# Patient Record
Sex: FEMALE | Race: WHITE | Employment: UNEMPLOYED | URBAN - NONMETROPOLITAN AREA
[De-identification: names, ages, dates, MRNs, and addresses within clinical notes are randomized per-mention and may not be internally consistent; named-entity substitution may affect disease eponyms.]

---

## 2020-11-12 ENCOUNTER — APPOINTMENT (OUTPATIENT)
Dept: GENERAL RADIOLOGY | Age: 20
End: 2020-11-12
Attending: EMERGENCY MEDICINE
Payer: COMMERCIAL

## 2020-11-12 ENCOUNTER — HOSPITAL ENCOUNTER (EMERGENCY)
Age: 20
Discharge: HOME | End: 2020-11-12
Attending: EMERGENCY MEDICINE
Payer: COMMERCIAL

## 2020-11-12 VITALS
OXYGEN SATURATION: 97 % | TEMPERATURE: 97.7 F | SYSTOLIC BLOOD PRESSURE: 111 MMHG | RESPIRATION RATE: 18 BRPM | DIASTOLIC BLOOD PRESSURE: 53 MMHG | HEART RATE: 60 BPM

## 2020-11-12 DIAGNOSIS — R07.9 CHEST PAIN, UNSPECIFIED TYPE: Primary | ICD-10-CM

## 2020-11-12 DIAGNOSIS — N39.0 URINARY TRACT INFECTION WITHOUT HEMATURIA, SITE UNSPECIFIED: ICD-10-CM

## 2020-11-12 LAB
AMPHET UR QL SCN: NEGATIVE
APPEARANCE UR: CLEAR
ATRIAL RATE: 58 BPM
BACTERIA URNS QL MICRO: ABNORMAL /HPF
BARBITURATES UR QL SCN: NEGATIVE
BENZODIAZ UR QL: NEGATIVE
BILIRUB UR QL: NEGATIVE
CALCULATED P AXIS, ECG09: 37 DEGREES
CALCULATED R AXIS, ECG10: 56 DEGREES
CALCULATED T AXIS, ECG11: 37 DEGREES
CANNABINOIDS UR QL SCN: NEGATIVE
COCAINE UR QL SCN: NEGATIVE
COLOR UR: ABNORMAL
DIAGNOSIS, 93000: NORMAL
DRUG SCRN COMMENT,DRGCM: NORMAL
ECSTASY, ECST: NEGATIVE
GLUCOSE UR STRIP.AUTO-MCNC: NEGATIVE MG/DL
HCG UR QL: NEGATIVE
HGB UR QL STRIP: NEGATIVE
KETONES UR QL STRIP.AUTO: NEGATIVE MG/DL
LEUKOCYTE ESTERASE UR QL STRIP.AUTO: ABNORMAL
METHADONE UR QL: NEGATIVE
NITRITE UR QL STRIP.AUTO: NEGATIVE
OPIATES UR QL: NEGATIVE
P-R INTERVAL, ECG05: 146 MS
PCP UR QL: NEGATIVE
PH UR STRIP: 6 [PH] (ref 5–8)
PROT UR STRIP-MCNC: NEGATIVE MG/DL
Q-T INTERVAL, ECG07: 427 MS
QRS DURATION, ECG06: 91 MS
QTC CALCULATION (BEZET), ECG08: 434 MS
RBC #/AREA URNS HPF: ABNORMAL /HPF (ref 0–3)
SP GR UR REFRACTOMETRY: 1.01 (ref 1–1.03)
UROBILINOGEN UR QL STRIP.AUTO: 0.2 EU/DL (ref 0.2–1)
VENTRICULAR RATE, ECG03: 62 BPM
WBC URNS QL MICRO: ABNORMAL /HPF (ref 0–5)

## 2020-11-12 PROCEDURE — 81001 URINALYSIS AUTO W/SCOPE: CPT

## 2020-11-12 PROCEDURE — 99284 EMERGENCY DEPT VISIT MOD MDM: CPT

## 2020-11-12 PROCEDURE — 81025 URINE PREGNANCY TEST: CPT

## 2020-11-12 PROCEDURE — 80307 DRUG TEST PRSMV CHEM ANLYZR: CPT

## 2020-11-12 PROCEDURE — 74011250637 HC RX REV CODE- 250/637: Performed by: EMERGENCY MEDICINE

## 2020-11-12 PROCEDURE — 93005 ELECTROCARDIOGRAM TRACING: CPT

## 2020-11-12 PROCEDURE — 71045 X-RAY EXAM CHEST 1 VIEW: CPT

## 2020-11-12 RX ORDER — CEPHALEXIN 500 MG/1
500 CAPSULE ORAL 2 TIMES DAILY
Qty: 10 CAP | Refills: 0 | Status: SHIPPED | OUTPATIENT
Start: 2020-11-12 | End: 2020-11-17

## 2020-11-12 RX ORDER — CEPHALEXIN 250 MG/1
500 CAPSULE ORAL
Status: COMPLETED | OUTPATIENT
Start: 2020-11-12 | End: 2020-11-12

## 2020-11-12 RX ADMIN — CEPHALEXIN 500 MG: 250 CAPSULE ORAL at 14:05

## 2020-11-12 NOTE — ED TRIAGE NOTES
Pt states she was sitting in class at work when she felt an unexplained sharp pain to the chest. Pt rates pain at 3/10 now. Was given 324 of aspirin en route. Pt states she has a past history of anxiety. Pt states this is a new job and she feels overwhelmed with coworkers.

## 2020-11-12 NOTE — ED PROVIDER NOTES
EMERGENCY DEPARTMENT HISTORY AND PHYSICAL EXAM      Date: 11/12/2020  Patient Name: Rand Stern    History of Presenting Illness     Chief Complaint   Patient presents with    Chest Pain    Anxiety       History Provided By: Patient    HPI: Rand tSern, 23 y.o. female with a past medical history significant Anxiety presents to the ED with cc of sudden onset of a sharp chest pain with a rated 8 out of 10 while patient was sitting down at her worksite associated symptoms of dizziness; patient was given 4 aspirins and the pain is now like a dull ache 3 out of 10; patient denies any diaphoresis nausea or vomiting    There are no other complaints, changes, or physical findings at this time. PCP: No primary care provider on file. No current facility-administered medications on file prior to encounter. No current outpatient medications on file prior to encounter. Past History     Past Medical History:  Past Medical History:   Diagnosis Date    Anxiety        Past Surgical History:  History reviewed. No pertinent surgical history. Family History:  History reviewed. No pertinent family history. Social History:  Social History     Tobacco Use    Smoking status: Never Smoker    Smokeless tobacco: Never Used   Substance Use Topics    Alcohol use: Yes     Comment: Socially    Drug use: Never       Allergies: Allergies   Allergen Reactions    Coconut Rash and Swelling         Review of Systems     Review of Systems   Constitutional: Negative for chills and fever. HENT: Negative for rhinorrhea and sore throat. Eyes: Negative for pain and visual disturbance. Respiratory: Negative for cough, chest tightness and shortness of breath. Cardiovascular: Positive for chest pain. Negative for leg swelling. Gastrointestinal: Negative for abdominal pain and nausea. Endocrine: Negative for polydipsia and polyuria. Genitourinary: Negative for dysuria and urgency.    Musculoskeletal: Negative for back pain and myalgias. Skin: Negative for color change and pallor. Neurological: Negative for weakness and numbness. Psychiatric/Behavioral: Negative. Physical Exam     Physical Exam  Vitals signs and nursing note reviewed. Constitutional:       Appearance: She is obese. HENT:      Head: Normocephalic and atraumatic. Eyes:      Extraocular Movements: Extraocular movements intact. Pupils: Pupils are equal, round, and reactive to light. Neck:      Musculoskeletal: Normal range of motion and neck supple. Cardiovascular:      Rate and Rhythm: Normal rate and regular rhythm. Heart sounds: Normal heart sounds. Pulmonary:      Effort: Pulmonary effort is normal.      Breath sounds: Normal breath sounds. Chest:      Chest wall: No tenderness or edema. Abdominal:      General: Bowel sounds are normal.      Palpations: Abdomen is soft. Musculoskeletal: Normal range of motion. Skin:     General: Skin is warm and dry. Capillary Refill: Capillary refill takes less than 2 seconds. Neurological:      General: No focal deficit present. Mental Status: She is alert and oriented to person, place, and time. Psychiatric:         Mood and Affect: Mood normal.         Behavior: Behavior normal.         Lab and Diagnostic Study Results     Labs -   No results found for this or any previous visit (from the past 12 hour(s)). Radiologic Studies -   @lastxrresult@  CT Results  (Last 48 hours)    None        CXR Results  (Last 48 hours)    None            Medical Decision Making   - I am the first provider for this patient. - I reviewed the vital signs, available nursing notes, past medical history, past surgical history, family history and social history. - Initial assessment performed. The patients presenting problems have been discussed, and they are in agreement with the care plan formulated and outlined with them.   I have encouraged them to ask questions as they arise throughout their visit. Vital Signs-Reviewed the patient's vital signs. Patient Vitals for the past 12 hrs:   Temp Pulse Resp BP SpO2   11/12/20 1028     97 %   11/12/20 1021 97.7 °F (36.5 °C) 81 16 (!) 113/52 97 %       Records Reviewed: Nursing Notes    The patient presents with chest pain with a differential diagnosis of  ACS, arrhythmia and pnuemonia      ED Course:     ED Course as of Nov 12 1302   Thu Nov 12, 2020   1124 Patient refused to have any blood work drawn    [SB]   1125 EKG rate 62 sinus rhythm normal axis WA interval 146 QT interval 434 no ST elevations or depressions flattened T waves in leads III, V2, V3    [SB]   1204 Urine Drug Screen [SB]   1205 Leukocyte Esterase(!): Moderate [SB]   1300 Patient informed of lab results we will treat for UTI; chest pain-free    [SB]      ED Course User Index  [SB] Julianna Burnett MD       Provider Notes (Medical Decision Making): MDM       Procedures   Medical Decision Makingedical Decision Making  Performed by: Sreekanth Bowden MD  PROCEDURES:  Procedures       Disposition   Disposition: Condition stable and improved  DC- Adult Discharges: All of the diagnostic tests were reviewed and questions answered. Diagnosis, care plan and treatment options were discussed. The patient understands the instructions and will follow up as directed. The patients results have been reviewed with them. They have been counseled regarding their diagnosis. The patient verbally convey understanding and agreement of the signs, symptoms, diagnosis, treatment and prognosis and additionally agrees to follow up as recommended with their PCP in 24 - 48 hours. They also agree with the care-plan and convey that all of their questions have been answered.   I have also put together some discharge instructions for them that include: 1) educational information regarding their diagnosis, 2) how to care for their diagnosis at home, as well a 3) list of reasons why they would want to return to the ED prior to their follow-up appointment, should their condition change. DISCHARGE PLAN:  1. There are no discharge medications for this patient. 2.   Follow-up Information    None       3. Return to ED if worse   4. There are no discharge medications for this patient. Diagnosis     Clinical Impression: No diagnosis found. Attestations:    Yokasta Kothari MD    Please note that this dictation was completed with Flasht[employer], the computer voice recognition software. Quite often unanticipated grammatical, syntax, homophones, and other interpretive errors are inadvertently transcribed by the computer software. Please disregard these errors. Please excuse any errors that have escaped final proofreading. Thank you.

## 2020-11-12 NOTE — DISCHARGE INSTRUCTIONS
Thank you! Thank you for allowing me to care for you in the emergency department. I sincerely hope that you are satisfied with your visit today. It is my goal to provide you with excellent care. Below you will find a list of your labs and imaging from your visit today. Should you have any questions regarding these results please do not hesitate to call the emergency department. Labs -     Recent Results (from the past 12 hour(s))   URINALYSIS W/ RFLX MICROSCOPIC    Collection Time: 11/12/20 11:00 AM   Result Value Ref Range    Color Yellow/Straw      Appearance Clear Clear      Specific gravity 1.015 1.003 - 1.030      pH (UA) 6.0 5.0 - 8.0      Protein Negative Negative mg/dL    Glucose Negative Negative mg/dL    Ketone Negative Negative mg/dL    Bilirubin Negative Negative      Blood Negative Negative      Urobilinogen 0.2 0.2 - 1.0 EU/dL    Nitrites Negative Negative      Leukocyte Esterase Moderate (A) Negative     HCG URINE, QL    Collection Time: 11/12/20 11:00 AM   Result Value Ref Range    HCG urine, QL Negative Negative     DRUG SCREEN, URINE    Collection Time: 11/12/20 11:00 AM   Result Value Ref Range    AMPHETAMINES Negative Negative      BARBITURATES Negative Negative      BENZODIAZEPINES Negative Negative      COCAINE Negative Negative      ECSTASY, MDMA Negative Negative      METHADONE Negative Negative      OPIATES Negative Negative      PCP(PHENCYCLIDINE) Negative Negative      THC (TH-CANNABINOL) Negative Negative      Drug screen comment        This test is a screen for drugs of abuse in a medical setting only (i.e., they are unconfirmed results and as such must not be used for non-medical purposes, e.g.,employment testing, legal testing). Due to its inherent nature, false positive (FP) and false negative (FN) results may be obtained. Therefore, if necessary for medical care, recommend confirmation of positive findings by GC/MS.    URINE MICROSCOPIC    Collection Time: 11/12/20 11:00 AM Result Value Ref Range    WBC 10-20 0 - 5 /hpf    RBC 0-5 0 - 3 /hpf    Bacteria 1+ (A) Negative /hpf   EKG, 12 LEAD, INITIAL    Collection Time: 11/12/20 11:19 AM   Result Value Ref Range    Ventricular Rate 62 BPM    Atrial Rate 58 BPM    P-R Interval 146 ms    QRS Duration 91 ms    Q-T Interval 427 ms    QTC Calculation (Bezet) 434 ms    Calculated P Axis 37 degrees    Calculated R Axis 56 degrees    Calculated T Axis 37 degrees    Diagnosis Sinus rhythm        Radiologic Studies -   XR CHEST PORT   Final Result   Impression: Unremarkable chest x-ray. CT Results  (Last 48 hours)      None          CXR Results  (Last 48 hours)                 11/12/20 1104  XR CHEST PORT Final result    Impression:  Impression: Unremarkable chest x-ray. Narrative:  History chest pain. No comparison study is available. An AP portable upright view of the chest demonstrates no pneumonia, pneumothorax   or effusion. The heart is not enlarged. The osseous structures are within normal   limits. If you feel that you have not received excellent quality care or timely care, please ask to speak to the nurse manager. Please choose us in the future for your continued health care needs. ------------------------------------------------------------------------------------------------------------  The exam and treatment you received in the Emergency Department were for an urgent problem and are not intended as complete care. It is important that you follow-up with a doctor, nurse practitioner, or physician assistant to:  (1) confirm your diagnosis,  (2) re-evaluation of changes in your illness and treatment, and  (3) for ongoing care. If your symptoms become worse or you do not improve as expected and you are unable to reach your usual health care provider, you should return to the Emergency Department. We are available 24 hours a day.      Please take your discharge instructions with you when you go to your follow-up appointment. If you have any problem arranging a follow-up appointment, contact the Emergency Department immediately. If a prescription has been provided, please have it filled as soon as possible to prevent a delay in treatment. Read the entire medication instruction sheet provided to you by the pharmacy. If you have any questions or reservations about taking the medication due to side effects or interactions with other medications, please call your primary care physician or contact the ER to speak with the charge nurse. Make an appointment with your family doctor or the physician you were referred to for follow-up of this visit as instructed on your discharge paperwork, as this is a mandatory follow-up. Return to the ER if you are unable to be seen or if you are unable to be seen in a timely manner. If you have any problem arranging the follow-up visit, contact the Emergency Department immediately.

## 2020-11-12 NOTE — LETTER
[address] [facility] EMERGENCY DEPT 
[address] 
[phone] Work/School Note Date: 11/12/2020 To Whom It May concern: 
 
 
Parviz Pompa was seen and treated today in the emergency room by the following provider(s): 
Attending Provider: Judi Choi MD. Parviz Pompa is excused from work/school on 11/12/20. She is clear to return to work/school on 11/13/20. Sincerely, 
 
 
 
 
Xiao Diaz

## 2021-02-15 ENCOUNTER — HOSPITAL ENCOUNTER (EMERGENCY)
Age: 21
Discharge: HOME | End: 2021-02-15
Attending: EMERGENCY MEDICINE
Payer: COMMERCIAL

## 2021-02-15 VITALS
OXYGEN SATURATION: 97 % | HEART RATE: 94 BPM | DIASTOLIC BLOOD PRESSURE: 77 MMHG | HEIGHT: 64 IN | TEMPERATURE: 98.1 F | WEIGHT: 220 LBS | SYSTOLIC BLOOD PRESSURE: 128 MMHG | BODY MASS INDEX: 37.56 KG/M2 | RESPIRATION RATE: 18 BRPM

## 2021-02-15 DIAGNOSIS — S00.03XA RIGHT TEMPORAL FRONTAL SCALP CONTUSIONS, INITIAL ENCOUNTER: Primary | ICD-10-CM

## 2021-02-15 PROCEDURE — 99283 EMERGENCY DEPT VISIT LOW MDM: CPT

## 2021-02-15 PROCEDURE — 74011250637 HC RX REV CODE- 250/637: Performed by: EMERGENCY MEDICINE

## 2021-02-15 RX ORDER — ACETAMINOPHEN 500 MG
1000 TABLET ORAL
Status: COMPLETED | OUTPATIENT
Start: 2021-02-15 | End: 2021-02-15

## 2021-02-15 RX ADMIN — ACETAMINOPHEN 1000 MG: 500 TABLET ORAL at 05:06

## 2021-02-15 NOTE — Clinical Note
[address] 
[facility] EMERGENCY DEPT 
[address] 
[phone] Work/School Note Date: 2/15/2021 To Whom It May concern: 
 
 
Martha Cortés was seen and treated today in the emergency room by the following provider(s): 
Attending Provider: Teddy Torres MD. Martha Cortés is excused from work/school on 02/15/21. She is clear to return to work/school on 02/16/21.    
 
 
Sincerely, 
 
 
 
 
Michelle Rose MD

## 2021-02-15 NOTE — ED NOTES
Pt d/c by this RN, all questions and concerns addressed. Pt verbalized understanding of d/c and f/u instructions, ambulatory with steady gait out of ED. Pt continues to deny feeling lightheaded, dizzy or any visual changes.

## 2021-02-15 NOTE — ED PROVIDER NOTES
Patient presents after she tripped and hit her head on the right  Side. No LOC. She denies any neurological complaints.     Duration:  3 1/2 houss    Intensity: moderate    Modified by: nothing.                Past Medical History:   Diagnosis Date   • Anxiety        History reviewed. No pertinent surgical history.      History reviewed. No pertinent family history.    Social History     Socioeconomic History   • Marital status: SINGLE     Spouse name: Not on file   • Number of children: Not on file   • Years of education: Not on file   • Highest education level: Not on file   Occupational History   • Not on file   Social Needs   • Financial resource strain: Not on file   • Food insecurity     Worry: Not on file     Inability: Not on file   • Transportation needs     Medical: Not on file     Non-medical: Not on file   Tobacco Use   • Smoking status: Never Smoker   • Smokeless tobacco: Never Used   Substance and Sexual Activity   • Alcohol use: Yes     Comment: Socially   • Drug use: Never   • Sexual activity: Yes     Partners: Male     Birth control/protection: Condom   Lifestyle   • Physical activity     Days per week: Not on file     Minutes per session: Not on file   • Stress: Not on file   Relationships   • Social connections     Talks on phone: Not on file     Gets together: Not on file     Attends [employer] service: Not on file     Active member of club or organization: Not on file     Attends meetings of clubs or organizations: Not on file     Relationship status: Not on file   • Intimate partner violence     Fear of current or ex partner: Not on file     Emotionally abused: Not on file     Physically abused: Not on file     Forced sexual activity: Not on file   Other Topics Concern   • Not on file   Social History Narrative   • Not on file         ALLERGIES: Coconut    Review of Systems   Constitutional: Negative.    HENT: Negative.    Eyes: Negative.    Respiratory: Negative.    Cardiovascular: Negative.   Gastrointestinal: Negative. Endocrine: Negative. Genitourinary: Negative. Skin: Negative. Allergic/Immunologic: Negative. Neurological: Positive for headaches. Negative for dizziness, facial asymmetry, weakness, light-headedness and numbness. Hematological: Negative. Psychiatric/Behavioral: Negative. All other systems reviewed and are negative. Vitals:    02/15/21 0440   BP: 128/77   Pulse: 94   Resp: 18   Temp: 98.1 °F (36.7 °C)   SpO2: 97%   Weight: 99.8 kg (220 lb)   Height: 5' 4\" (1.626 m)            Physical Exam  Vitals signs and nursing note reviewed. Constitutional:       Appearance: She is well-developed. HENT:      Head: Normocephalic and atraumatic. Comments: Moderate tenderness on palpation of right parietal scalp  Eyes:      Extraocular Movements: Extraocular movements intact. Pupils: Pupils are equal, round, and reactive to light. Neck:      Musculoskeletal: Normal range of motion and neck supple. Cardiovascular:      Rate and Rhythm: Normal rate and regular rhythm. Heart sounds: Normal heart sounds. Pulmonary:      Breath sounds: Normal breath sounds. Abdominal:      General: Bowel sounds are normal.      Palpations: Abdomen is soft. Musculoskeletal: Normal range of motion. Neurological:      General: No focal deficit present. Mental Status: She is alert and oriented to person, place, and time. Mental status is at baseline. Cranial Nerves: No cranial nerve deficit. Sensory: No sensory deficit. Motor: No weakness.    Psychiatric:         Mood and Affect: Mood normal.         Behavior: Behavior normal.          MDM  Number of Diagnoses or Management Options  Risk of Complications, Morbidity, and/or Mortality  Presenting problems: moderate  Diagnostic procedures: low  Management options: low           Procedures

## 2021-02-15 NOTE — ED TRIAGE NOTES
Patient states she was at work. States she went home during break to walk dog. States dog pulled leash and she slipped hitting posterior scalp on concrete surface.

## 2021-02-26 ENCOUNTER — OFFICE VISIT (OUTPATIENT)
Dept: OBGYN CLINIC | Age: 21
End: 2021-02-26
Payer: COMMERCIAL

## 2021-02-26 VITALS
WEIGHT: 222.13 LBS | BODY MASS INDEX: 37.92 KG/M2 | DIASTOLIC BLOOD PRESSURE: 76 MMHG | TEMPERATURE: 98 F | HEIGHT: 64 IN | SYSTOLIC BLOOD PRESSURE: 124 MMHG

## 2021-02-26 DIAGNOSIS — Z00.00 ANNUAL VISIT FOR GENERAL ADULT MEDICAL EXAMINATION WITHOUT ABNORMAL FINDINGS: ICD-10-CM

## 2021-02-26 DIAGNOSIS — Z11.51 SCREENING FOR HUMAN PAPILLOMAVIRUS: ICD-10-CM

## 2021-02-26 DIAGNOSIS — Z11.3 SCREENING FOR VENEREAL DISEASE: ICD-10-CM

## 2021-02-26 DIAGNOSIS — Z01.419 PAP SMEAR, AS PART OF ROUTINE GYNECOLOGICAL EXAMINATION: Primary | ICD-10-CM

## 2021-02-26 PROCEDURE — 99395 PREV VISIT EST AGE 18-39: CPT | Performed by: OBSTETRICS & GYNECOLOGY

## 2021-02-26 NOTE — PROGRESS NOTES
Karen Brown is a 23 y.o. female who presents today for the following:  Chief Complaint   Patient presents with    Annual Exam     Pt presents for annual exam and to discuss ttc. Pt states has irregular cycles and has not had a cycle since october//Fabrice     Family Planning        Allergies   Allergen Reactions    Coconut Rash and Swelling       No current outpatient medications on file. No current facility-administered medications for this visit.         Past Medical History:   Diagnosis Date    Anxiety     Irregular menses        Past Surgical History:   Procedure Laterality Date    HX WISDOM TEETH EXTRACTION         Family History   Problem Relation Age of Onset    Hypertension Maternal Grandmother     Hypertension Maternal Grandfather     Diabetes Maternal Grandfather     Breast Cancer Paternal Grandmother     Hypertension Paternal Grandmother     Hypertension Paternal Grandfather        Social History     Socioeconomic History    Marital status: SINGLE     Spouse name: Not on file    Number of children: Not on file    Years of education: Not on file    Highest education level: Not on file   Occupational History    Not on file   Social Needs    Financial resource strain: Not on file    Food insecurity     Worry: Not on file     Inability: Not on file    Transportation needs     Medical: Not on file     Non-medical: Not on file   Tobacco Use    Smoking status: Never Smoker    Smokeless tobacco: Never Used   Substance and Sexual Activity    Alcohol use: Yes     Comment: Socially    Drug use: Never    Sexual activity: Yes     Partners: Male     Birth control/protection: Condom   Lifestyle    Physical activity     Days per week: Not on file     Minutes per session: Not on file    Stress: Not on file   Relationships    Social connections     Talks on phone: Not on file     Gets together: Not on file     Attends Jainism service: Not on file     Active member of club or organization: Not on file     Attends meetings of clubs or organizations: Not on file     Relationship status: Not on file    Intimate partner violence     Fear of current or ex partner: Not on file     Emotionally abused: Not on file     Physically abused: Not on file     Forced sexual activity: Not on file   Other Topics Concern    Not on file   Social History Narrative    Not on file         ROS   Review of Systems   Constitutional: Negative. HENT: Negative. Eyes: Negative. Respiratory: Negative. Cardiovascular: Negative. Gastrointestinal: Negative. Genitourinary: Negative. Musculoskeletal: Negative. Skin: Negative. Neurological: Negative. Endo/Heme/Allergies: Negative. Psychiatric/Behavioral: Negative. /76   Temp 98 °F (36.7 °C)   Ht 5' 4\" (1.626 m)   Wt 222 lb 2 oz (100.8 kg)   LMP 10/05/2020   BMI 38.13 kg/m²    OBGyn Exam   Constitutional  · Appearance: well-nourished, well developed, alert, in no acute distress    HENT  · Head and Face: appears normal    Neck  · Inspection/Palpation: normal appearance, no masses or tenderness  · Lymph Nodes: no lymphadenopathy present  · Thyroid: gland size normal, nontender, no nodules or masses present on palpation    Breasts   Symmetric, no palpable masses, no tenderness, no skin changes, no nipple abnormality, no nipple discharge, no lymphadenopathy.     Chest  · Respiratory Effort: breathing labored  · Auscultation: normal breath sounds    Cardiovascular  · Heart:  · Auscultation: regular rate and rhythm without murmur    Gastrointestinal  · Abdominal Examination: abdomen non-tender to palpation, normal bowel sounds, no masses present  · Liver and spleen: no hepatomegaly present, spleen not palpable  · Hernias: no hernias identified    Genitourinary  · External Genitalia: normal appearance for age, no discharge present, no tenderness present, no inflammatory lesions present, no masses present, no atrophy present  · Vagina: normal vaginal vault without central or paravaginal defects, no discharge present, no inflammatory lesions present, no masses present  · Bladder: non-tender to palpation  · Urethra: appears normal  · Cervix: normal   · Uterus: normal size, shape and consistency  · Adnexa: no adnexal tenderness present, no adnexal masses present  · Perineum: perineum within normal limits, no evidence of trauma, no rashes or skin lesions present  · Anus: anus within normal limits, no hemorrhoids present  · Inguinal Lymph Nodes: no lymphadenopathy present    Skin  · General Inspection: no rash, no lesions identified    Neurologic/Psychiatric  · Mental Status:  · Orientation: grossly oriented to person, place and time  · Mood and Affect: mood normal, affect appropriate    No results found for this visit on 02/26/21. Orders Placed This Encounter    PAP IG, CT-NG-TV, RFX APTIMA HPV ASCUS ([id number])     Order Specific Question:   Pap Source? Answer:   Cervical     Order Specific Question:   Total Hysterectomy? Answer:   No     Order Specific Question:   Supracervical Hysterectomy? Answer:   No     Order Specific Question:   Post Menopausal?     Answer:   No     Order Specific Question:   Hormone Therapy? Answer:   No     Order Specific Question:   IUD? Answer:   No     Order Specific Question:   Abnormal Bleeding? Answer:   No     Order Specific Question:   Pregnant     Answer:   No     Order Specific Question:   Post Partum? Answer:   No         1. Pap smear, as part of routine gynecological examination    - PAP IG, CT-NG-TV, RFX APTIMA HPV ASCUS ([id number])    2. Screening for human papillomavirus    - PAP IG, CT-NG-TV, RFX APTIMA HPV ASCUS ([id number])    3. Screening for venereal disease    - PAP IG, CT-NG-TV, RFX APTIMA HPV ASCUS ([id number])    4.  Annual visit for general adult medical examination without abnormal findings    TRYING TO CONCEIVE  [address]

## 2021-03-03 LAB
C TRACH RRNA CVX QL NAA+PROBE: NEGATIVE
CYTOLOGIST CVX/VAG CYTO: NORMAL
CYTOLOGY CVX/VAG DOC CYTO: NORMAL
CYTOLOGY CVX/VAG DOC THIN PREP: NORMAL
DX ICD CODE: NORMAL
LABCORP, 190119: NORMAL
Lab: NORMAL
N GONORRHOEA RRNA CVX QL NAA+PROBE: NEGATIVE
OTHER STN SPEC: NORMAL
STAT OF ADQ CVX/VAG CYTO-IMP: NORMAL
T VAGINALIS RRNA SPEC QL NAA+PROBE: NEGATIVE

## 2021-12-28 ENCOUNTER — HOSPITAL ENCOUNTER (EMERGENCY)
Age: 21
Discharge: HOME | End: 2021-12-29

## 2021-12-28 VITALS
OXYGEN SATURATION: 99 % | SYSTOLIC BLOOD PRESSURE: 124 MMHG | WEIGHT: 230 LBS | BODY MASS INDEX: 39.27 KG/M2 | TEMPERATURE: 97.9 F | HEART RATE: 126 BPM | HEIGHT: 64 IN | RESPIRATION RATE: 20 BRPM | DIASTOLIC BLOOD PRESSURE: 58 MMHG

## 2021-12-28 DIAGNOSIS — L05.01 PILONIDAL ABSCESS: Primary | ICD-10-CM

## 2021-12-28 PROCEDURE — 75810000289 HC I&D ABSCESS SIMP/COMP/MULT

## 2021-12-28 PROCEDURE — 99283 EMERGENCY DEPT VISIT LOW MDM: CPT

## 2021-12-28 PROCEDURE — 74011000250 HC RX REV CODE- 250: Performed by: PHYSICIAN ASSISTANT

## 2021-12-28 PROCEDURE — 74011250637 HC RX REV CODE- 250/637: Performed by: PHYSICIAN ASSISTANT

## 2021-12-28 RX ORDER — LIDOCAINE HYDROCHLORIDE 10 MG/ML
20 INJECTION INFILTRATION; PERINEURAL ONCE
Status: COMPLETED | OUTPATIENT
Start: 2021-12-28 | End: 2021-12-28

## 2021-12-28 RX ORDER — HYDROCODONE BITARTRATE AND ACETAMINOPHEN 5; 325 MG/1; MG/1
1 TABLET ORAL
Status: COMPLETED | OUTPATIENT
Start: 2021-12-28 | End: 2021-12-28

## 2021-12-28 RX ADMIN — LIDOCAINE HYDROCHLORIDE 20 ML: 10 INJECTION, SOLUTION INFILTRATION; PERINEURAL at 23:42

## 2021-12-28 RX ADMIN — HYDROCODONE BITARTRATE AND ACETAMINOPHEN 1 TABLET: 5; 325 TABLET ORAL at 23:41

## 2021-12-29 PROCEDURE — 75810000289 HC I&D ABSCESS SIMP/COMP/MULT

## 2021-12-29 PROCEDURE — 99283 EMERGENCY DEPT VISIT LOW MDM: CPT

## 2021-12-29 RX ORDER — CEPHALEXIN 500 MG/1
500 CAPSULE ORAL 3 TIMES DAILY
Qty: 21 CAPSULE | Refills: 0 | Status: SHIPPED | OUTPATIENT
Start: 2021-12-29 | End: 2022-01-05

## 2021-12-29 RX ORDER — SULFAMETHOXAZOLE AND TRIMETHOPRIM 800; 160 MG/1; MG/1
1 TABLET ORAL 2 TIMES DAILY
Qty: 14 TABLET | Refills: 0 | Status: SHIPPED | OUTPATIENT
Start: 2021-12-29 | End: 2022-01-05

## 2021-12-29 NOTE — DISCHARGE INSTRUCTIONS
Warm compresses multiple times per day  Return to ER or general surgery office in 3-4 days for packing removal

## 2021-12-29 NOTE — ED PROVIDER NOTES
EMERGENCY DEPARTMENT HISTORY AND PHYSICAL EXAM      Date: 12/28/2021  Patient Name: Dona Christianson    History of Presenting Illness     Chief Complaint   Patient presents with    Abscess       History Provided By: Patient    HPI: Dona Christianson, 24 y.o. female with a past medical history significant anxiety presents to the ED with cc of pain to the upper portion of her buttocks onset 2 days ago, significantly worse today, pain worse with sitting and palpation of the area. Patient has not examined the area herself due to location. Patient has not tried any treatments at home at this time. She denies any history of similar symptoms. Patient denies fever, chills, body aches, nausea, vomiting, any chance of pregnancy. Last menstrual cycle was 2 weeks ago. There are no other complaints, changes, or physical findings at this time. PCP: None    No current facility-administered medications on file prior to encounter. No current outpatient medications on file prior to encounter. Past History     Past Medical History:  Past Medical History:   Diagnosis Date    Anxiety     Irregular menses        Past Surgical History:  Past Surgical History:   Procedure Laterality Date    HX WISDOM TEETH EXTRACTION         Family History:  Family History   Problem Relation Age of Onset    Hypertension Maternal Grandmother     Hypertension Maternal Grandfather     Diabetes Maternal Grandfather     Breast Cancer Paternal Grandmother     Hypertension Paternal Grandmother     Hypertension Paternal Grandfather        Social History:  Social History     Tobacco Use    Smoking status: Never Smoker    Smokeless tobacco: Never Used   Vaping Use    Vaping Use: Former   Substance Use Topics    Alcohol use: Yes     Comment: Socially    Drug use: Never       Allergies:   Allergies   Allergen Reactions    Coconut Rash and Swelling         Review of Systems   Review of Systems   Constitutional: Negative for activity change, chills and fever. HENT: Negative for congestion, ear pain, rhinorrhea, sneezing and sore throat. Eyes: Negative for pain and visual disturbance. Respiratory: Negative for cough and shortness of breath. Cardiovascular: Negative for chest pain. Gastrointestinal: Negative for abdominal pain, diarrhea, nausea and vomiting. Genitourinary: Negative for dysuria and hematuria. Musculoskeletal: Negative for gait problem. Skin: Negative for rash. Buttocks pain   Neurological: Negative for speech difficulty, weakness and headaches. Psychiatric/Behavioral: The patient is not nervous/anxious. All other systems reviewed and are negative. Physical Exam   Physical Exam  Vitals and nursing note reviewed. Constitutional:       General: She is not in acute distress. Appearance: Normal appearance. She is not ill-appearing or toxic-appearing. HENT:      Head: Normocephalic and atraumatic. Nose: Nose normal.      Mouth/Throat:      Mouth: Mucous membranes are moist.   Eyes:      Extraocular Movements: Extraocular movements intact. Conjunctiva/sclera: Conjunctivae normal.      Pupils: Pupils are equal, round, and reactive to light. Cardiovascular:      Rate and Rhythm: Tachycardia present. Pulses: Normal pulses. Heart sounds: Normal heart sounds. Pulmonary:      Effort: Pulmonary effort is normal. No respiratory distress. Breath sounds: Normal breath sounds. Musculoskeletal:         General: No deformity or signs of injury. Normal range of motion. Cervical back: Normal range of motion. Skin:     General: Skin is warm and dry. Capillary Refill: Capillary refill takes less than 2 seconds. Findings: Abscess and erythema present. No rash. Comments: +3cm abscess noted to superior gluteal cleft c/w pilonidal abscess, +erythema and tenderness to palpation with central area of fluctuance   Neurological:      General: No focal deficit present. Mental Status: She is alert and oriented to person, place, and time. Cranial Nerves: No cranial nerve deficit. Psychiatric:         Mood and Affect: Mood normal.         Diagnostic Study Results     Labs -   No results found for this or any previous visit (from the past 48 hour(s)). Radiologic Studies -   Results from Hospital Encounter encounter on 11/12/20    XR CHEST PORT    Narrative  History chest pain. No comparison study is available. An AP portable upright view of the chest demonstrates no pneumonia, pneumothorax  or effusion. The heart is not enlarged. The osseous structures are within normal  limits. Impression  Impression: Unremarkable chest x-ray. CT Results  (Last 48 hours)    None          Medical Decision Making   I am the first provider for this patient. I reviewed the vital signs, available nursing notes, past medical history, past surgical history, family history and social history. Vital Signs-Reviewed the patient's vital signs. Wt Readings from Last 3 Encounters:   12/28/21 104.3 kg (230 lb)   02/26/21 100.8 kg (222 lb 2 oz)   02/15/21 99.8 kg (220 lb)     Temp Readings from Last 3 Encounters:   12/28/21 97.9 °F (36.6 °C)   02/26/21 98 °F (36.7 °C)   02/15/21 98.1 °F (36.7 °C)     BP Readings from Last 3 Encounters:   12/28/21 (!) 124/58   02/26/21 124/76   02/15/21 128/77     Pulse Readings from Last 3 Encounters:   12/28/21 (!) 126   02/15/21 94   11/12/20 60       No data found. Records Reviewed: Nursing Notes and Old Medical Records    Provider Notes (Medical Decision Making):     MDM  Number of Diagnoses or Management Options  Pilonidal abscess  Diagnosis management comments: 21-year-old female is presenting with pilonidal abscess. The area was anesthetized, incised, and drained. Copious amounts of purulent and bloody material expressed from the area. The wound was packed with iodoform gauze. Patient tachycardic secondary to pain.   No other sirs criteria. Afebrile. Patient has been advised to return to the emergency department in 3 to 4 days for packing removal and likely replacement. She is also been given general surgery follow-up for pilonidal cyst extraction. Amount and/or Complexity of Data Reviewed  Review and summarize past medical records: yes        ED Course:   Initial assessment performed. The patients presenting problems have been discussed, and they are in agreement with the care plan formulated and outlined with them. I have encouraged them to ask questions as they arise throughout their visit. PROCEDURES    I&D Abcess Complex    Date/Time: 12/29/2021 12:25 AM  Performed by: Judy Butler PA-C  Authorized by: Judy Butler PA-C     Consent:     Consent obtained:  Verbal    Consent given by:  Patient    Risks discussed:  Bleeding, incomplete drainage, pain and infection    Alternatives discussed:  Referral, observation and alternative treatment  Location:     Type:  Pilonidal cyst    Location:  Anogenital    Anogenital location:  Pilonidal  Pre-procedure details:     Skin preparation:  Betadine  Anesthesia (see MAR for exact dosages): Anesthesia method:  Local infiltration    Local anesthetic:  Lidocaine 1% w/o epi  Procedure type:     Complexity:  Complex  Procedure details:     Needle aspiration: no      Incision types:  Stab incision    Incision depth:  Dermal    Scalpel blade:  11    Wound management:  Probed and deloculated, irrigated with saline and extensive cleaning    Drainage:  Bloody and purulent    Drainage amount:  Copious    Wound treatment:  Wound left open    Packing materials:  1/4 in iodoform gauze  Post-procedure details:     Patient tolerance of procedure: Tolerated well, no immediate complications           Disposition     Disposition: DC- Adult Discharges: All of the diagnostic tests were reviewed and questions answered. Diagnosis, care plan and treatment options were discussed.   The patient understands the instructions and will follow up as directed. The patients results have been reviewed with them. They have been counseled regarding their diagnosis. The patient verbally convey understanding and agreement of the signs, symptoms, diagnosis, treatment and prognosis and additionally agrees to follow up as recommended with their PCP in 24 - 48 hours. They also agree with the care-plan and convey that all of their questions have been answered. I have also put together some discharge instructions for them that include: 1) educational information regarding their diagnosis, 2) how to care for their diagnosis at home, as well a 3) list of reasons why they would want to return to the ED prior to their follow-up appointment, should their condition change. Discharged       DISCHARGE PLAN:  1. There are no discharge medications for this patient. 2.   Follow-up Information     Follow up With Specialties Details Why Contact Info    Carlito Raines MD Colon and Rectal Surgery, General Surgery Schedule an appointment as soon as possible for a visit  for follow up from ER visit [phone]  [phone]      [facility] EMERGENCY DEPT Emergency Medicine  As needed, If symptoms worsen [address] [facility] [address] [phone]        3. Return to ED if worse   4. Discharge Medication List as of 12/29/2021 12:51 AM      START taking these medications    Details   trimethoprim-sulfamethoxazole (Bactrim DS) 160-800 mg per tablet Take 1 Tablet by mouth two (2) times a day for 7 days. , Normal, Disp-14 Tablet, R-0      cephALEXin (Keflex) 500 mg capsule Take 1 Capsule by mouth three (3) times daily for 7 days. , Normal, Disp-21 Capsule, R-0             Diagnosis     Clinical Impression:   1.  Pilonidal abscess

## 2021-12-29 NOTE — ED TRIAGE NOTES
[facility] pt stated that last Thursday she started to feel pain to her tailbone; stated that today while walking the dogs she had terrible pain and couldn't hardly move; area to coccyx red, swollen and painful

## 2022-01-02 ENCOUNTER — HOSPITAL ENCOUNTER (EMERGENCY)
Age: 22
Discharge: HOME | End: 2022-01-02
Attending: EMERGENCY MEDICINE
Payer: COMMERCIAL

## 2022-01-02 VITALS
HEIGHT: 64 IN | RESPIRATION RATE: 16 BRPM | TEMPERATURE: 98 F | BODY MASS INDEX: 39.27 KG/M2 | OXYGEN SATURATION: 98 % | HEART RATE: 77 BPM | WEIGHT: 230 LBS | DIASTOLIC BLOOD PRESSURE: 61 MMHG | SYSTOLIC BLOOD PRESSURE: 115 MMHG

## 2022-01-02 DIAGNOSIS — L05.01 PILONIDAL ABSCESS: Primary | ICD-10-CM

## 2022-01-02 PROCEDURE — 99283 EMERGENCY DEPT VISIT LOW MDM: CPT

## 2022-01-02 PROCEDURE — 74011250637 HC RX REV CODE- 250/637: Performed by: EMERGENCY MEDICINE

## 2022-01-02 RX ORDER — HYDROCODONE BITARTRATE AND ACETAMINOPHEN 5; 325 MG/1; MG/1
1 TABLET ORAL
Status: COMPLETED | OUTPATIENT
Start: 2022-01-02 | End: 2022-01-02

## 2022-01-02 RX ADMIN — HYDROCODONE BITARTRATE AND ACETAMINOPHEN 1 TABLET: 5; 325 TABLET ORAL at 12:46

## 2022-01-02 NOTE — ED TRIAGE NOTES
Pt states was supposed to follow up with surgeon (abscess near \"tailbone\"). Can't see surgeon until Jan 20 so was instructed to come back to ED in couldn't see surgeon in a week or so. Pinky Angles

## 2022-01-02 NOTE — ED PROVIDER NOTES
EMERGENCY DEPARTMENT HISTORY AND PHYSICAL EXAM      Date: 1/2/2022  Patient Name: Don Goldstein    History of Presenting Illness     No chief complaint on file. History Provided By: Patient    HPI: Don Goldstein, 24 y.o. female with a past medical history significant No significant past medical history presents to the ED with cc of reevaluation of condyle abscess which was drained on 12/20/2021. Patient underwent I&D of toenail abscess at this time with subsequent packing. She was instructed to return to the ED for possible repeat packing if she is unable to follow-up with her surgeon in this timeframe. Patient reports persistent pain to the area. She is currently taking Bactrim and Keflex as prescribed. Patient unable to see a surgeon until the 20th of this month. There are no other complaints, changes, or physical findings at this time. PCP: None    No current facility-administered medications on file prior to encounter. Current Outpatient Medications on File Prior to Encounter   Medication Sig Dispense Refill    trimethoprim-sulfamethoxazole (Bactrim DS) 160-800 mg per tablet Take 1 Tablet by mouth two (2) times a day for 7 days. 14 Tablet 0    cephALEXin (Keflex) 500 mg capsule Take 1 Capsule by mouth three (3) times daily for 7 days.  21 Capsule 0       Past History     Past Medical History:  Past Medical History:   Diagnosis Date    Anxiety     Irregular menses        Past Surgical History:  Past Surgical History:   Procedure Laterality Date    HX WISDOM TEETH EXTRACTION         Family History:  Family History   Problem Relation Age of Onset    Hypertension Maternal Grandmother     Hypertension Maternal Grandfather     Diabetes Maternal Grandfather     Breast Cancer Paternal Grandmother     Hypertension Paternal Grandmother     Hypertension Paternal Grandfather        Social History:  Social History     Tobacco Use    Smoking status: Never Smoker    Smokeless tobacco: Never Used Vaping Use    Vaping Use: Former   Substance Use Topics    Alcohol use: Yes     Comment: Socially    Drug use: Never       Allergies: Allergies   Allergen Reactions    Coconut Rash and Swelling         Review of Systems     Review of Systems   Constitutional: Negative for chills and fever. HENT: Negative for congestion and sore throat. Eyes: Negative for pain and visual disturbance. Respiratory: Negative for cough and shortness of breath. Cardiovascular: Negative for chest pain and palpitations. Gastrointestinal: Negative for constipation, diarrhea, nausea and vomiting. Genitourinary: Negative for dysuria and frequency. Musculoskeletal: Negative for arthralgias and myalgias. Skin: Positive for wound. Negative for color change and rash. Neurological: Negative for dizziness, weakness, light-headedness and headaches. Psychiatric/Behavioral: Negative for dysphoric mood and sleep disturbance. The patient is nervous/anxious. Physical Exam     Physical Exam  Constitutional:       Appearance: Normal appearance. HENT:      Head: Normocephalic and atraumatic. Right Ear: External ear normal.      Left Ear: External ear normal.      Nose: Nose normal.      Mouth/Throat:      Mouth: Mucous membranes are moist.   Eyes:      Extraocular Movements: Extraocular movements intact. Conjunctiva/sclera: Conjunctivae normal.   Cardiovascular:      Rate and Rhythm: Normal rate and regular rhythm. Pulses: Normal pulses. Heart sounds: Normal heart sounds. Pulmonary:      Effort: Pulmonary effort is normal.      Breath sounds: Normal breath sounds. Abdominal:      General: Abdomen is flat. There is no distension. Palpations: Abdomen is soft. Tenderness: There is no abdominal tenderness. Musculoskeletal:         General: Normal range of motion. Cervical back: Normal range of motion. Skin:     General: Skin is warm and dry.       Capillary Refill: Capillary refill takes less than 2 seconds. Comments: Vertical linear incision just proximal to the  cleft approximately 0.25 inches in length with iodoform packing in place. Iodoform packing removed in its entirety and repacked with quarter inch packing strip. No evidence of surrounding cellulitis or reaccumulation of abscess. Neurological:      General: No focal deficit present. Mental Status: She is alert and oriented to person, place, and time. Mental status is at baseline. Psychiatric:         Mood and Affect: Mood normal.         Behavior: Behavior normal.         Lab and Diagnostic Study Results     Labs -   No results found for this or any previous visit (from the past 12 hour(s)). Radiologic Studies -   @lastxrresult@  CT Results  (Last 48 hours)    None        CXR Results  (Last 48 hours)    None            Medical Decision Making   - I am the first provider for this patient. - I reviewed the vital signs, available nursing notes, past medical history, past surgical history, family history and social history. - Initial assessment performed. The patients presenting problems have been discussed, and they are in agreement with the care plan formulated and outlined with them. I have encouraged them to ask questions as they arise throughout their visit. Vital Signs-Reviewed the patient's vital signs. Patient Vitals for the past 12 hrs:   Temp Pulse Resp BP SpO2   22 1207 98 °F (36.7 °C) 77 16 115/61 98 %       Records Reviewed: Nursing Notes    The patient presents with pilonidal abscess reevaluation with a differential diagnosis of draining pilonidal abscess, developing cellulitis, abscess reaccumulation      ED Course:          Provider Notes (Medical Decision Making):   68-year-old female presenting for reevaluation of her abscess which was drained last week. Patient taking antibiotics as prescribed. On physical exam iodoform gauze removed.   No evidence of surrounding cellulitis or reaccumulation of abscess. New, clean quarter inch iodoform packing gauze reinserted. Patient tolerated procedure without difficulty. Patient instructed to follow-up with their primary care physician and return to the ED if they develops any new or worsening symptoms. MDM       Procedures   Medical Decision Makingedical Decision Making  Performed by: Darryle Moloney, DO  PROCEDURES:  Procedures       Disposition   Disposition: Condition stable  DC- Adult Discharges: All of the diagnostic tests were reviewed and questions answered. Diagnosis, care plan and treatment options were discussed. The patient understands the instructions and will follow up as directed. The patients results have been reviewed with them. They have been counseled regarding their diagnosis. The patient verbally convey understanding and agreement of the signs, symptoms, diagnosis, treatment and prognosis and additionally agrees to follow up as recommended with their PCP in 24 - 48 hours. They also agree with the care-plan and convey that all of their questions have been answered. I have also put together some discharge instructions for them that include: 1) educational information regarding their diagnosis, 2) how to care for their diagnosis at home, as well a 3) list of reasons why they would want to return to the ED prior to their follow-up appointment, should their condition change. DC-The patient was given verbal pilonidal abscess instructions    Discharged    DISCHARGE PLAN:  1. Current Discharge Medication List      CONTINUE these medications which have NOT CHANGED    Details   trimethoprim-sulfamethoxazole (Bactrim DS) 160-800 mg per tablet Take 1 Tablet by mouth two (2) times a day for 7 days. Qty: 14 Tablet, Refills: 0      cephALEXin (Keflex) 500 mg capsule Take 1 Capsule by mouth three (3) times daily for 7 days. Qty: 21 Capsule, Refills: 0           2.    Follow-up Information     Follow up With Specialties Details Why Contact Info    [address] [facility] EMERGENCY DEPT Emergency Medicine  As needed, If symptoms worsen [address] [facility] [address] [phone]        3. Return to ED if worse   4. Current Discharge Medication List            Diagnosis     Clinical Impression:   1. Pilonidal abscess        Attestations:    Kelin Tripp, DO    Please note that this dictation was completed with [employer], the computer voice recognition software. Quite often unanticipated grammatical, syntax, homophones, and other interpretive errors are inadvertently transcribed by the computer software. Please disregard these errors. Please excuse any errors that have escaped final proofreading. Thank you.

## 2022-01-20 ENCOUNTER — OFFICE VISIT (OUTPATIENT)
Dept: SURGERY | Age: 22
End: 2022-01-20
Payer: COMMERCIAL

## 2022-01-20 VITALS
HEART RATE: 92 BPM | WEIGHT: 217.8 LBS | TEMPERATURE: 97.5 F | SYSTOLIC BLOOD PRESSURE: 90 MMHG | BODY MASS INDEX: 37.18 KG/M2 | HEIGHT: 64 IN | RESPIRATION RATE: 18 BRPM | DIASTOLIC BLOOD PRESSURE: 62 MMHG | OXYGEN SATURATION: 97 %

## 2022-01-20 DIAGNOSIS — L05.91 PILONIDAL CYST: Primary | ICD-10-CM

## 2022-01-20 PROCEDURE — 99204 OFFICE O/P NEW MOD 45 MIN: CPT | Performed by: COLON & RECTAL SURGERY

## 2022-01-20 RX ORDER — DIPHENHYDRAMINE HCL 25 MG
25 TABLET ORAL
COMMUNITY

## 2022-01-20 NOTE — PROGRESS NOTES
OFFICE VISIT NOTE    Stacy Johnson is a 24 y.o. female who presents to the office today for:    Chief Complaint   Patient presents with    New Patient     Cyst on Tailbone        Ms. Ralf Lopez is a 69-year-old female who is referred for evaluation of a pilonidal cyst.  She had an I&D of a pilonidal cyst abscess in the emergency department [facility] on December 31, 2021. She was followed up in the emergency department 2 days later with changing of the packing. She states that she has since pulled out the packing herself. She is currently covering the area with a dry dressing. She says the drainage has decreased. She denies having anything similar in the past.    Her past medical history is otherwise significant for history of anxiety disorder. She denies any past surgical history. She has no medication allergies.       Past Medical History:   Diagnosis Date    Anxiety     Irregular menses        Past Surgical History:   Procedure Laterality Date    HX WISDOM TEETH EXTRACTION         Family History   Problem Relation Age of Onset    Hypertension Maternal Grandmother     Hypertension Maternal Grandfather     Diabetes Maternal Grandfather     Breast Cancer Paternal Grandmother     Hypertension Paternal Grandmother     Hypertension Paternal Grandfather        Social History     Socioeconomic History    Marital status:      Spouse name: Not on file    Number of children: Not on file    Years of education: Not on file    Highest education level: Not on file   Occupational History    Not on file   Tobacco Use    Smoking status: Never Smoker    Smokeless tobacco: Never Used   Vaping Use    Vaping Use: Former   Substance and Sexual Activity    Alcohol use: Yes     Comment: Socially    Drug use: Never    Sexual activity: Yes     Partners: Male     Birth control/protection: Condom   Other Topics Concern    Not on file   Social History Narrative    Not on file     Social Determinants of Health     Financial Resource Strain:     Difficulty of Paying Living Expenses: Not on file   Food Insecurity:     Worried About Running Out of Food in the Last Year: Not on file    Hi of Food in the Last Year: Not on file   Transportation Needs:     Lack of Transportation (Medical): Not on file    Lack of Transportation (Non-Medical): Not on file   Physical Activity:     Days of Exercise per Week: Not on file    Minutes of Exercise per Session: Not on file   Stress:     Feeling of Stress : Not on file   Social Connections:     Frequency of Communication with Friends and Family: Not on file    Frequency of Social Gatherings with Friends and Family: Not on file    Attends Restoration Services: Not on file    Active Member of [address] [place] [address] [employer] or Organizations: Not on file    Attends Club or Organization Meetings: Not on file    Marital Status: Not on file   Intimate Partner Violence:     Fear of Current or Ex-Partner: Not on file    Emotionally Abused: Not on file    Physically Abused: Not on file    Sexually Abused: Not on file   Housing Stability:     Unable to Pay for Housing in the Last Year: Not on file    Number of [place] in the Last Year: Not on file    Unstable Housing in the Last Year: Not on file       Allergies   Allergen Reactions    Coconut Rash and Swelling       Current Outpatient Medications   Medication Sig    diphenhydrAMINE (Benadryl Allergy) 25 mg tablet Take 25 mg by mouth every six (6) hours as needed. No current facility-administered medications for this visit. Review of Systems   Constitutional: Negative. HENT: Negative. Eyes: Negative. Respiratory: Negative. Cardiovascular: Negative. Gastrointestinal: Negative. Genitourinary: Negative. Musculoskeletal: Negative. Skin: Negative. Neurological: Negative. Endo/Heme/Allergies: Negative. Psychiatric/Behavioral: Negative. BP 90/62 (BP 1 Location: Left arm, BP Patient Position: Sitting)   Pulse 92   Temp 97.5 °F (36.4 °C) (Temporal)   Resp 18   Ht 5' 4\" (1.626 m)   Wt 217 lb 12.8 oz (98.8 kg)   SpO2 97%   BMI 37.39 kg/m²     Physical Exam  Constitutional:       General: She is not in acute distress. Appearance: Normal appearance. She is not ill-appearing. HENT:      Head: Normocephalic and atraumatic. Cardiovascular:      Rate and Rhythm: Normal rate and regular rhythm. Pulmonary:      Effort: Pulmonary effort is normal.   Abdominal:      General: There is no distension. Palpations: Abdomen is soft. Genitourinary:     Comments: Pilonidal cyst midline above the gluteal cleft small I&D site to the right of midline almost healed, no persistent or recurrent abscess  Musculoskeletal:         General: Normal range of motion. Cervical back: Normal range of motion. Skin:     General: Skin is warm and dry. Neurological:      General: No focal deficit present. Mental Status: She is alert and oriented to person, place, and time. Psychiatric:         Mood and Affect: Mood normal.         Thought Content: Thought content normal.         Judgment: Judgment normal.         Problem List Items Addressed This Visit        Integumentary    Pilonidal cyst - Primary          Assessment and Plan:  Extensive discussion with Ms. Sonali Oliveira regarding surgical management of her pilonidal cyst.  I told her that. She has not necessarily have to have surgery however chances of recurrent abscess are fairly high. She does wish to have surgery. I reviewed the risk and benefits including risk of infection, bleeding, wound complications, recurrence. Also told that she will have an open wound that will need to get a showerhead as hand-held surgical wash the wound twice daily and cover with a dry dressing.   She wishes to proceed and surgery has been scheduled for January 31, 2022.             Kristy Mcdaniel MD

## 2022-01-20 NOTE — PROGRESS NOTES
Chief Complaint   Patient presents with    New Patient     Cyst on Tailbone      Visit Vitals  BP 90/62 (BP 1 Location: Left arm, BP Patient Position: Sitting)   Pulse 92   Temp 97.5 °F (36.4 °C) (Temporal)   Resp 18   Ht 5' 4\" (1.626 m)   Wt 217 lb 12.8 oz (98.8 kg)   SpO2 97%   BMI 37.39 kg/m²

## 2022-01-20 NOTE — H&P (VIEW-ONLY)
OFFICE VISIT NOTE    Dona Christianson is a 24 y.o. female who presents to the office today for:    Chief Complaint   Patient presents with    New Patient     Cyst on Tailbone        Ms. Gissell Amezcua is a 30-year-old female who is referred for evaluation of a pilonidal cyst.  She had an I&D of a pilonidal cyst abscess in the emergency department [facility] on December 31, 2021. She was followed up in the emergency department 2 days later with changing of the packing. She states that she has since pulled out the packing herself. She is currently covering the area with a dry dressing. She says the drainage has decreased. She denies having anything similar in the past.    Her past medical history is otherwise significant for history of anxiety disorder. She denies any past surgical history. She has no medication allergies.       Past Medical History:   Diagnosis Date    Anxiety     Irregular menses        Past Surgical History:   Procedure Laterality Date    HX WISDOM TEETH EXTRACTION         Family History   Problem Relation Age of Onset    Hypertension Maternal Grandmother     Hypertension Maternal Grandfather     Diabetes Maternal Grandfather     Breast Cancer Paternal Grandmother     Hypertension Paternal Grandmother     Hypertension Paternal Grandfather        Social History     Socioeconomic History    Marital status:      Spouse name: Not on file    Number of children: Not on file    Years of education: Not on file    Highest education level: Not on file   Occupational History    Not on file   Tobacco Use    Smoking status: Never Smoker    Smokeless tobacco: Never Used   Vaping Use    Vaping Use: Former   Substance and Sexual Activity    Alcohol use: Yes     Comment: Socially    Drug use: Never    Sexual activity: Yes     Partners: Male     Birth control/protection: Condom   Other Topics Concern    Not on file   Social History Narrative    Not on file     Social Determinants of Health     Financial Resource Strain:     Difficulty of Paying Living Expenses: Not on file   Food Insecurity:     Worried About Running Out of Food in the Last Year: Not on file    Hi of Food in the Last Year: Not on file   Transportation Needs:     Lack of Transportation (Medical): Not on file    Lack of Transportation (Non-Medical): Not on file   Physical Activity:     Days of Exercise per Week: Not on file    Minutes of Exercise per Session: Not on file   Stress:     Feeling of Stress : Not on file   Social Connections:     Frequency of Communication with Friends and Family: Not on file    Frequency of Social Gatherings with Friends and Family: Not on file    Attends Oriental orthodox Services: Not on file    Active Member of [address] [place] [address] [employer] or Organizations: Not on file    Attends Club or Organization Meetings: Not on file    Marital Status: Not on file   Intimate Partner Violence:     Fear of Current or Ex-Partner: Not on file    Emotionally Abused: Not on file    Physically Abused: Not on file    Sexually Abused: Not on file   Housing Stability:     Unable to Pay for Housing in the Last Year: Not on file    Number of [place] in the Last Year: Not on file    Unstable Housing in the Last Year: Not on file       Allergies   Allergen Reactions    Coconut Rash and Swelling       Current Outpatient Medications   Medication Sig    diphenhydrAMINE (Benadryl Allergy) 25 mg tablet Take 25 mg by mouth every six (6) hours as needed. No current facility-administered medications for this visit. Review of Systems   Constitutional: Negative. HENT: Negative. Eyes: Negative. Respiratory: Negative. Cardiovascular: Negative. Gastrointestinal: Negative. Genitourinary: Negative. Musculoskeletal: Negative. Skin: Negative. Neurological: Negative. Endo/Heme/Allergies: Negative. Psychiatric/Behavioral: Negative. BP 90/62 (BP 1 Location: Left arm, BP Patient Position: Sitting)   Pulse 92   Temp 97.5 °F (36.4 °C) (Temporal)   Resp 18   Ht 5' 4\" (1.626 m)   Wt 217 lb 12.8 oz (98.8 kg)   SpO2 97%   BMI 37.39 kg/m²     Physical Exam  Constitutional:       General: She is not in acute distress. Appearance: Normal appearance. She is not ill-appearing. HENT:      Head: Normocephalic and atraumatic. Cardiovascular:      Rate and Rhythm: Normal rate and regular rhythm. Pulmonary:      Effort: Pulmonary effort is normal.   Abdominal:      General: There is no distension. Palpations: Abdomen is soft. Genitourinary:     Comments: Pilonidal cyst midline above the gluteal cleft small I&D site to the right of midline almost healed, no persistent or recurrent abscess  Musculoskeletal:         General: Normal range of motion. Cervical back: Normal range of motion. Skin:     General: Skin is warm and dry. Neurological:      General: No focal deficit present. Mental Status: She is alert and oriented to person, place, and time. Psychiatric:         Mood and Affect: Mood normal.         Thought Content: Thought content normal.         Judgment: Judgment normal.         Problem List Items Addressed This Visit        Integumentary    Pilonidal cyst - Primary          Assessment and Plan:  Extensive discussion with Ms. Ramila Reyez regarding surgical management of her pilonidal cyst.  I told her that. She has not necessarily have to have surgery however chances of recurrent abscess are fairly high. She does wish to have surgery. I reviewed the risk and benefits including risk of infection, bleeding, wound complications, recurrence. Also told that she will have an open wound that will need to get a showerhead as hand-held surgical wash the wound twice daily and cover with a dry dressing.   She wishes to proceed and surgery has been scheduled for January 31, 2022.             Debroah Mcardle, MD

## 2022-01-27 ENCOUNTER — HOSPITAL ENCOUNTER (OUTPATIENT)
Dept: PREADMISSION TESTING | Age: 22
Discharge: HOME | End: 2022-01-27
Payer: COMMERCIAL

## 2022-01-27 VITALS
TEMPERATURE: 98 F | WEIGHT: 212 LBS | DIASTOLIC BLOOD PRESSURE: 75 MMHG | BODY MASS INDEX: 35.32 KG/M2 | OXYGEN SATURATION: 98 % | RESPIRATION RATE: 20 BRPM | HEART RATE: 85 BPM | SYSTOLIC BLOOD PRESSURE: 124 MMHG | HEIGHT: 65 IN

## 2022-01-27 LAB
ANION GAP SERPL CALC-SCNC: 10 MMOL/L (ref 5–15)
BUN SERPL-MCNC: 19 MG/DL (ref 6–20)
BUN/CREAT SERPL: 24 (ref 12–20)
CA-I BLD-MCNC: 9.5 MG/DL (ref 8.5–10.1)
CHLORIDE SERPL-SCNC: 103 MMOL/L (ref 97–108)
CO2 SERPL-SCNC: 27 MMOL/L (ref 21–32)
CREAT SERPL-MCNC: 0.8 MG/DL (ref 0.55–1.02)
ERYTHROCYTE [DISTWIDTH] IN BLOOD BY AUTOMATED COUNT: 13.5 % (ref 11.5–14.5)
FLUAV RNA SPEC QL NAA+PROBE: NOT DETECTED
FLUBV RNA SPEC QL NAA+PROBE: NOT DETECTED
GLUCOSE SERPL-MCNC: 97 MG/DL (ref 65–100)
HCT VFR BLD AUTO: 41.9 % (ref 36–46)
HGB BLD-MCNC: 14.6 G/DL (ref 13.5–17.5)
MCH RBC QN AUTO: 30.6 PG (ref 31–34)
MCHC RBC AUTO-ENTMCNC: 34.9 G/DL (ref 31–36)
MCV RBC AUTO: 87.6 FL (ref 80–100)
NRBC # BLD: 0.01 K/UL
NRBC BLD-RTO: 0.1 PER 100 WBC
PLATELET # BLD AUTO: 273 K/UL (ref 150–400)
PMV BLD AUTO: 9 FL (ref 6.5–11.5)
POTASSIUM SERPL-SCNC: 4.5 MMOL/L (ref 3.5–5.1)
RBC # BLD AUTO: 4.79 M/UL (ref 4.5–5.9)
SARS-COV-2, COV2: NOT DETECTED
SODIUM SERPL-SCNC: 140 MMOL/L (ref 136–145)
WBC # BLD AUTO: 8.5 K/UL (ref 4.4–11.3)

## 2022-01-27 PROCEDURE — 85027 COMPLETE CBC AUTOMATED: CPT

## 2022-01-27 PROCEDURE — 87636 SARSCOV2 & INF A&B AMP PRB: CPT

## 2022-01-27 PROCEDURE — 80048 BASIC METABOLIC PNL TOTAL CA: CPT

## 2022-01-28 ENCOUNTER — TELEPHONE (OUTPATIENT)
Dept: SURGERY | Age: 22
End: 2022-01-28

## 2022-01-28 NOTE — TELEPHONE ENCOUNTER
Harrington Boxer called from [place] [address] Department. She need a pre authorization for patient that will be having surgery on 1.31.22. Give her a call back at [phone]

## 2022-01-31 ENCOUNTER — ANESTHESIA (OUTPATIENT)
Dept: SURGERY | Age: 22
End: 2022-01-31
Payer: COMMERCIAL

## 2022-01-31 ENCOUNTER — HOSPITAL ENCOUNTER (OUTPATIENT)
Age: 22
Setting detail: OUTPATIENT SURGERY
Discharge: HOME | End: 2022-01-31
Attending: COLON & RECTAL SURGERY | Admitting: COLON & RECTAL SURGERY
Payer: COMMERCIAL

## 2022-01-31 ENCOUNTER — ANESTHESIA EVENT (OUTPATIENT)
Dept: SURGERY | Age: 22
End: 2022-01-31
Payer: COMMERCIAL

## 2022-01-31 VITALS
OXYGEN SATURATION: 96 % | DIASTOLIC BLOOD PRESSURE: 73 MMHG | RESPIRATION RATE: 18 BRPM | SYSTOLIC BLOOD PRESSURE: 129 MMHG | HEART RATE: 104 BPM | TEMPERATURE: 97.2 F

## 2022-01-31 DIAGNOSIS — L05.91 PILONIDAL CYST: Primary | ICD-10-CM

## 2022-01-31 LAB — HCG UR QL: NEGATIVE

## 2022-01-31 PROCEDURE — 74011000250 HC RX REV CODE- 250: Performed by: COLON & RECTAL SURGERY

## 2022-01-31 PROCEDURE — 74011250636 HC RX REV CODE- 250/636: Performed by: COLON & RECTAL SURGERY

## 2022-01-31 PROCEDURE — 74011250636 HC RX REV CODE- 250/636: Performed by: NURSE ANESTHETIST, CERTIFIED REGISTERED

## 2022-01-31 PROCEDURE — 2709999900 HC NON-CHARGEABLE SUPPLY: Performed by: COLON & RECTAL SURGERY

## 2022-01-31 PROCEDURE — 74011000250 HC RX REV CODE- 250: Performed by: NURSE ANESTHETIST, CERTIFIED REGISTERED

## 2022-01-31 PROCEDURE — 77030019895 HC PCKNG STRP IODO -A: Performed by: COLON & RECTAL SURGERY

## 2022-01-31 PROCEDURE — 76210000026 HC REC RM PH II 1 TO 1.5 HR: Performed by: COLON & RECTAL SURGERY

## 2022-01-31 PROCEDURE — 88304 TISSUE EXAM BY PATHOLOGIST: CPT

## 2022-01-31 PROCEDURE — 81025 URINE PREGNANCY TEST: CPT

## 2022-01-31 PROCEDURE — 76210000063 HC OR PH I REC FIRST 0.5 HR: Performed by: COLON & RECTAL SURGERY

## 2022-01-31 PROCEDURE — 76060000032 HC ANESTHESIA 0.5 TO 1 HR: Performed by: COLON & RECTAL SURGERY

## 2022-01-31 PROCEDURE — 76010000138 HC OR TIME 0.5 TO 1 HR: Performed by: COLON & RECTAL SURGERY

## 2022-01-31 PROCEDURE — 11770 EXC PILONIDAL CYST SIMPLE: CPT | Performed by: COLON & RECTAL SURGERY

## 2022-01-31 RX ORDER — KETOROLAC TROMETHAMINE 30 MG/ML
INJECTION, SOLUTION INTRAMUSCULAR; INTRAVENOUS AS NEEDED
Status: DISCONTINUED | OUTPATIENT
Start: 2022-01-31 | End: 2022-01-31 | Stop reason: HOSPADM

## 2022-01-31 RX ORDER — ONDANSETRON 2 MG/ML
INJECTION INTRAMUSCULAR; INTRAVENOUS AS NEEDED
Status: DISCONTINUED | OUTPATIENT
Start: 2022-01-31 | End: 2022-01-31 | Stop reason: HOSPADM

## 2022-01-31 RX ORDER — OXYCODONE AND ACETAMINOPHEN 5; 325 MG/1; MG/1
2 TABLET ORAL
Status: DISCONTINUED | OUTPATIENT
Start: 2022-01-31 | End: 2022-01-31 | Stop reason: HOSPADM

## 2022-01-31 RX ORDER — LIDOCAINE HYDROCHLORIDE 20 MG/ML
INJECTION, SOLUTION EPIDURAL; INFILTRATION; INTRACAUDAL; PERINEURAL AS NEEDED
Status: DISCONTINUED | OUTPATIENT
Start: 2022-01-31 | End: 2022-01-31 | Stop reason: HOSPADM

## 2022-01-31 RX ORDER — FENTANYL CITRATE 50 UG/ML
INJECTION, SOLUTION INTRAMUSCULAR; INTRAVENOUS AS NEEDED
Status: DISCONTINUED | OUTPATIENT
Start: 2022-01-31 | End: 2022-01-31 | Stop reason: HOSPADM

## 2022-01-31 RX ORDER — SODIUM CHLORIDE 9 MG/ML
25 INJECTION, SOLUTION INTRAVENOUS CONTINUOUS
Status: DISCONTINUED | OUTPATIENT
Start: 2022-01-31 | End: 2022-01-31 | Stop reason: HOSPADM

## 2022-01-31 RX ORDER — BUPIVACAINE HYDROCHLORIDE AND EPINEPHRINE 5; 5 MG/ML; UG/ML
INJECTION, SOLUTION EPIDURAL; INTRACAUDAL; PERINEURAL AS NEEDED
Status: DISCONTINUED | OUTPATIENT
Start: 2022-01-31 | End: 2022-01-31 | Stop reason: HOSPADM

## 2022-01-31 RX ORDER — NORETHINDRONE AND ETHINYL ESTRADIOL 0.5-0.035
KIT ORAL AS NEEDED
Status: DISCONTINUED | OUTPATIENT
Start: 2022-01-31 | End: 2022-01-31 | Stop reason: HOSPADM

## 2022-01-31 RX ORDER — SUCCINYLCHOLINE CHLORIDE 20 MG/ML
INJECTION INTRAMUSCULAR; INTRAVENOUS AS NEEDED
Status: DISCONTINUED | OUTPATIENT
Start: 2022-01-31 | End: 2022-01-31 | Stop reason: HOSPADM

## 2022-01-31 RX ORDER — SODIUM CHLORIDE 9 MG/ML
INJECTION, SOLUTION INTRAVENOUS
Status: DISCONTINUED | OUTPATIENT
Start: 2022-01-31 | End: 2022-01-31 | Stop reason: HOSPADM

## 2022-01-31 RX ORDER — OXYCODONE AND ACETAMINOPHEN 5; 325 MG/1; MG/1
1 TABLET ORAL
Qty: 24 TABLET | Refills: 0 | Status: SHIPPED | OUTPATIENT
Start: 2022-01-31 | End: 2022-02-05

## 2022-01-31 RX ORDER — MIDAZOLAM HYDROCHLORIDE 1 MG/ML
INJECTION, SOLUTION INTRAMUSCULAR; INTRAVENOUS AS NEEDED
Status: DISCONTINUED | OUTPATIENT
Start: 2022-01-31 | End: 2022-01-31 | Stop reason: HOSPADM

## 2022-01-31 RX ORDER — KETAMINE HYDROCHLORIDE 10 MG/ML
INJECTION, SOLUTION INTRAMUSCULAR; INTRAVENOUS AS NEEDED
Status: DISCONTINUED | OUTPATIENT
Start: 2022-01-31 | End: 2022-01-31 | Stop reason: HOSPADM

## 2022-01-31 RX ORDER — DEXAMETHASONE SODIUM PHOSPHATE 4 MG/ML
INJECTION, SOLUTION INTRA-ARTICULAR; INTRALESIONAL; INTRAMUSCULAR; INTRAVENOUS; SOFT TISSUE AS NEEDED
Status: DISCONTINUED | OUTPATIENT
Start: 2022-01-31 | End: 2022-01-31 | Stop reason: HOSPADM

## 2022-01-31 RX ORDER — CEFAZOLIN SODIUM 1 G/3ML
INJECTION, POWDER, FOR SOLUTION INTRAMUSCULAR; INTRAVENOUS AS NEEDED
Status: DISCONTINUED | OUTPATIENT
Start: 2022-01-31 | End: 2022-01-31 | Stop reason: HOSPADM

## 2022-01-31 RX ORDER — BUPIVACAINE HYDROCHLORIDE AND EPINEPHRINE 5; 5 MG/ML; UG/ML
INJECTION, SOLUTION EPIDURAL; INTRACAUDAL; PERINEURAL
Status: DISCONTINUED
Start: 2022-01-31 | End: 2022-01-31 | Stop reason: HOSPADM

## 2022-01-31 RX ORDER — PROPOFOL 10 MG/ML
INJECTION, EMULSION INTRAVENOUS AS NEEDED
Status: DISCONTINUED | OUTPATIENT
Start: 2022-01-31 | End: 2022-01-31 | Stop reason: HOSPADM

## 2022-01-31 RX ADMIN — FENTANYL CITRATE 100 MCG: 50 INJECTION, SOLUTION INTRAMUSCULAR; INTRAVENOUS at 11:14

## 2022-01-31 RX ADMIN — SUCCINYLCHOLINE CHLORIDE 100 MG: 20 INJECTION, SOLUTION INTRAMUSCULAR; INTRAVENOUS at 11:20

## 2022-01-31 RX ADMIN — ONDANSETRON 4 MG: 2 INJECTION INTRAMUSCULAR; INTRAVENOUS at 11:28

## 2022-01-31 RX ADMIN — DEXAMETHASONE SODIUM PHOSPHATE 10 MG: 4 INJECTION, SOLUTION INTRAMUSCULAR; INTRAVENOUS at 11:29

## 2022-01-31 RX ADMIN — KETOROLAC TROMETHAMINE 30 MG: 30 INJECTION, SOLUTION INTRAMUSCULAR; INTRAVENOUS at 11:39

## 2022-01-31 RX ADMIN — Medication 30 MG: at 11:31

## 2022-01-31 RX ADMIN — LIDOCAINE HYDROCHLORIDE 60 MG: 20 INJECTION, SOLUTION EPIDURAL; INFILTRATION; INTRACAUDAL at 11:20

## 2022-01-31 RX ADMIN — MIDAZOLAM 2 MG: 1 INJECTION INTRAMUSCULAR; INTRAVENOUS at 11:14

## 2022-01-31 RX ADMIN — PROPOFOL 200 MG: 10 INJECTION, EMULSION INTRAVENOUS at 11:18

## 2022-01-31 RX ADMIN — CEFAZOLIN 2 G: 1 INJECTION, POWDER, FOR SOLUTION INTRAMUSCULAR; INTRAVENOUS at 11:29

## 2022-01-31 RX ADMIN — EPHEDRINE SULFATE 15 MG: 50 INJECTION, SOLUTION INTRAVENOUS at 12:04

## 2022-01-31 RX ADMIN — SODIUM CHLORIDE 25 ML/HR: 9 INJECTION, SOLUTION INTRAVENOUS at 09:10

## 2022-01-31 RX ADMIN — SODIUM CHLORIDE: 9 INJECTION, SOLUTION INTRAVENOUS at 11:33

## 2022-01-31 NOTE — ANESTHESIA PREPROCEDURE EVALUATION
Relevant Problems   No relevant active problems       Anesthetic History   No history of anesthetic complications  Other anesthesia complications          Review of Systems / Medical History  Patient summary reviewed, nursing notes reviewed and pertinent labs reviewed    Pulmonary  Within defined limits                 Neuro/Psych   Within defined limits           Cardiovascular  Within defined limits                     GI/Hepatic/Renal  Within defined limits              Endo/Other        Obesity     Other Findings              Physical Exam    Airway  Mallampati: II  TM Distance: 4 - 6 cm  Neck ROM: normal range of motion        Cardiovascular    Rhythm: regular  Rate: normal         Dental  No notable dental hx       Pulmonary  Breath sounds clear to auscultation               Abdominal  Abdominal exam normal       Other Findings            Anesthetic Plan    ASA: 2  Anesthesia type: general            Anesthetic plan and risks discussed with: Patient

## 2022-01-31 NOTE — INTERVAL H&P NOTE
Update History & Physical    The Patient's History and Physical of 1/20/2022 was reviewed with the patient and I examined the patient. There was no change. The surgical site was confirmed by the patient and me. Plan:  The risk, benefits, expected outcome, and alternative to the recommended procedure have been discussed with the patient. Patient understands and wants to proceed with the procedure.     Electronically signed by Brian Begum MD on 1/31/2022 at 11:03 AM

## 2022-01-31 NOTE — ANESTHESIA POSTPROCEDURE EVALUATION
Procedure(s):  EXCISION PILONIDAL CYST.    general    Anesthesia Post Evaluation      Multimodal analgesia: multimodal analgesia used between 6 hours prior to anesthesia start to PACU discharge  Patient location during evaluation: PACU  Patient participation: complete - patient participated  Pain score: 0  Pain management: adequate  Airway patency: patent  Anesthetic complications: no  Cardiovascular status: acceptable and stable  Respiratory status: acceptable and nasal cannula  Hydration status: acceptable  Comments: bp 129/85  Post anesthesia nausea and vomiting:  none  Final Post Anesthesia Temperature Assessment:  Normothermia (36.0-37.5 degrees C)      INITIAL Post-op Vital signs:   Vitals Value Taken Time   BP 89/38 01/31/22 1157   Temp 36.2 °C (97.2 °F) 01/31/22 1156   Pulse 74 01/31/22 1157   Resp 17 01/31/22 1157   SpO2 99 % 01/31/22 1157

## 2022-01-31 NOTE — OP NOTES
Operative Note    Patient: Gretchen Beal  YOB: 2000  MRN: [id number]    Date of Procedure: 1/31/2022     Pre-Op Diagnosis: Pilonidal cyst [L05.91]    Post-Op Diagnosis: Same as preoperative diagnosis. Procedure(s):  EXCISION PILONIDAL CYST    Surgeon(s):  Marya Ying MD    Surgical Assistant: Surg Asst-1: Vu Hernandez    Anesthesia: General     Estimated Blood Loss (mL):  Minimal    Complications: None    Specimens:   ID Type Source Tests Collected by Time Destination   1 : pilondial cyst Preservative   Marya Ying MD 1/31/2022 1134 Pathology        Implants: * No implants in log *    Drains: * No LDAs found *    Findings: pilonidal cyst, no abscess    Detailed Description of Procedure: The patient was brought to the operating room and, following the induction of general anesthesia was positioned on the OR table in the prone position. After ensuring that all pressure points adequately padded the table jackknifed in the bicipital port and the supraclavicular region prepped and draped in the standard sterile fashion. A lacrimal probe was used to probe the punctum of the pilonidal and the extent was mapped out. Lives with her sister has been encompassing the pilonidal cyst and taken down to subcutaneous tissues electrocautery. Subsequently was used to excise the cyst in its entirety which have been taken did not enter into the cyst cavity. Once his sister bedside excised was passed off the table. The resultant defect was measured approximately 5 cm x 2 cm x 3 cm depth was not thoroughly irrigated out. After ensuring meticulous hemostasis 0 point was a margin of epinephrine was infiltrated in the operative field. The cavity was packed with 1 inch iodoform gauze. Dressings applied and the procedure terminated. The patient was returned to the supine position, extubated, and transferred to recovery in stable condition.   All counts were correct at the close of the case.  Electronically Signed by Nae Srinivasan MD on 1/31/2022 at 11:45 AM

## 2022-01-31 NOTE — DISCHARGE INSTRUCTIONS
After wetting the packing with showerhead, remove packing this evening and cover wound with dry dressing  Rinse the wound twice daily with the shower on warm water pulse lavage and cover a dry dressing  Follow-up my office 1 week

## 2022-01-31 NOTE — PROGRESS NOTES
Verbalizes understanding of discharge instructions and follow-up care along with dressing changes.  informed and aware also.

## 2022-02-10 ENCOUNTER — OFFICE VISIT (OUTPATIENT)
Dept: SURGERY | Age: 22
End: 2022-02-10
Payer: COMMERCIAL

## 2022-02-10 VITALS
TEMPERATURE: 98.5 F | BODY MASS INDEX: 35.62 KG/M2 | HEIGHT: 65 IN | SYSTOLIC BLOOD PRESSURE: 112 MMHG | WEIGHT: 213.8 LBS | OXYGEN SATURATION: 97 % | DIASTOLIC BLOOD PRESSURE: 82 MMHG | RESPIRATION RATE: 16 BRPM | HEART RATE: 86 BPM

## 2022-02-10 DIAGNOSIS — L05.91 PILONIDAL CYST: Primary | ICD-10-CM

## 2022-02-10 PROCEDURE — 99024 POSTOP FOLLOW-UP VISIT: CPT | Performed by: COLON & RECTAL SURGERY

## 2022-02-10 NOTE — PROGRESS NOTES
Chief Complaint   Patient presents with    Post OP Follow Up     01/31/2022 Pilonidal cyst, Patient did not remove packing from surgery     Visit Vitals  /82 (BP 1 Location: Left arm, BP Patient Position: Sitting)   Pulse 86   Temp 98.5 °F (36.9 °C) (Temporal)   Resp 16   Ht 5' 5\" (1.651 m)   Wt 213 lb 12.8 oz (97 kg)   LMP  (LMP Unknown) Comment: menses irregular   SpO2 97%   BMI 35.58 kg/m²       1. Have you been to the ER, urgent care clinic since your last visit? Hospitalized since your last visit? No    2. Have you seen or consulted any other health care providers outside of the [address] [place] [address] since your last visit? Include any pap smears or colon screening.  No

## 2022-02-17 ENCOUNTER — OFFICE VISIT (OUTPATIENT)
Dept: SURGERY | Age: 22
End: 2022-02-17
Payer: COMMERCIAL

## 2022-02-17 VITALS
HEART RATE: 77 BPM | BODY MASS INDEX: 35.96 KG/M2 | HEIGHT: 65 IN | SYSTOLIC BLOOD PRESSURE: 118 MMHG | WEIGHT: 215.8 LBS | OXYGEN SATURATION: 97 % | RESPIRATION RATE: 16 BRPM | TEMPERATURE: 98.2 F | DIASTOLIC BLOOD PRESSURE: 70 MMHG

## 2022-02-17 DIAGNOSIS — L05.91 PILONIDAL CYST: Primary | ICD-10-CM

## 2022-02-17 PROCEDURE — 99024 POSTOP FOLLOW-UP VISIT: CPT | Performed by: COLON & RECTAL SURGERY

## 2022-02-17 NOTE — PROGRESS NOTES
Chief Complaint   Patient presents with    Follow-up     abscess     Visit Vitals  /70 (BP 1 Location: Right arm, BP Patient Position: Sitting)   Pulse 77   Temp 98.2 °F (36.8 °C) (Temporal)   Resp 16   Ht 5' 5\" (1.651 m)   Wt 215 lb 12.8 oz (97.9 kg)   LMP  (LMP Unknown) Comment: menses irregular   SpO2 97%   BMI 35.91 kg/m²     1. Have you been to the ER, urgent care clinic since your last visit? Hospitalized since your last visit? No    2. Have you seen or consulted any other health care providers outside of the [address] [place] [address] since your last visit? Include any pap smears or colon screening.  No

## 2022-02-17 NOTE — PROGRESS NOTES
OFFICE VISIT NOTE    Bethany Sexton is a 24 y.o. female who presents to the office today for:    Chief Complaint   Patient presents with    Follow-up     abscess       Ben Lewis comes in today for follow-up status post excision of pilonidal cyst on January 31, 2022. She has been packing the wound twice daily with saline wet-to-dry gauze. She has no complaints today. She denies any significant pain at the site. She denies any drainage. Past Medical History:   Diagnosis Date    Anxiety     Irregular menses        Past Surgical History:   Procedure Laterality Date    HX CYST REMOVAL  01/31/2022    Pilonidal Cyst    HX HEENT      tonsilectomy    HX WISDOM TEETH EXTRACTION         Family History   Problem Relation Age of Onset    Hypertension Maternal Grandmother     Hypertension Maternal Grandfather     Diabetes Maternal Grandfather     Breast Cancer Paternal Grandmother     Hypertension Paternal Grandmother     Hypertension Paternal Grandfather     No Known Problems Mother        Social History     Socioeconomic History    Marital status:      Spouse name: Not on file    Number of children: Not on file    Years of education: Not on file    Highest education level: Not on file   Occupational History    Not on file   Tobacco Use    Smoking status: Never Smoker    Smokeless tobacco: Never Used   Vaping Use    Vaping Use: Former   Substance and Sexual Activity    Alcohol use:  Yes     Alcohol/week: 1.0 standard drink     Types: 1 Glasses of wine per week     Comment: Socially    Drug use: Never    Sexual activity: Yes     Partners: Male     Birth control/protection: Condom   Other Topics Concern    Not on file   Social History Narrative    Not on file     Social Determinants of Health     Financial Resource Strain:     Difficulty of Paying Living Expenses: Not on file Food Insecurity:     Worried About Running Out of Food in the Last Year: Not on file    Hi of Food in the Last Year: Not on file   Transportation Needs:     Lack of Transportation (Medical): Not on file    Lack of Transportation (Non-Medical): Not on file   Physical Activity:     Days of Exercise per Week: Not on file    Minutes of Exercise per Session: Not on file   Stress:     Feeling of Stress : Not on file   Social Connections:     Frequency of Communication with Friends and Family: Not on file    Frequency of Social Gatherings with Friends and Family: Not on file    Attends Yazidism Services: Not on file    Active Member of [address] [place] [address] [employer] or Organizations: Not on file    Attends Club or Organization Meetings: Not on file    Marital Status: Not on file   Intimate Partner Violence:     Fear of Current or Ex-Partner: Not on file    Emotionally Abused: Not on file    Physically Abused: Not on file    Sexually Abused: Not on file   Housing Stability:     Unable to Pay for Housing in the Last Year: Not on file    Number of [place] in the Last Year: Not on file    Unstable Housing in the Last Year: Not on file       Allergies   Allergen Reactions    Coconut Rash and Swelling       Current Outpatient Medications   Medication Sig    diphenhydrAMINE (Benadryl Allergy) 25 mg tablet Take 25 mg by mouth every six (6) hours as needed. No current facility-administered medications for this visit. Review of Systems   All other systems reviewed and are negative. /70 (BP 1 Location: Right arm, BP Patient Position: Sitting)   Pulse 77   Temp 98.2 °F (36.8 °C) (Temporal)   Resp 16   Ht 5' 5\" (1.651 m)   Wt 215 lb 12.8 oz (97.9 kg)   LMP  (LMP Unknown) Comment: menses irregular  SpO2 97%   BMI 35.91 kg/m²     Physical Exam  Genitourinary:     Comments: Open wound at the top of the gluteal cleft is clean with granulation tissue.   There are no sinus tracts likely appreciated.         Problem List Items Addressed This Visit        Integumentary    Pilonidal cyst - Primary          Assessment and Plan:    Continue twice daily saline wet-to-dry dressing changes    Follow-up my office 1 week for wound check              Kelvin Carlson MD

## 2022-02-24 ENCOUNTER — OFFICE VISIT (OUTPATIENT)
Dept: SURGERY | Age: 22
End: 2022-02-24
Payer: COMMERCIAL

## 2022-02-24 VITALS
WEIGHT: 216.4 LBS | HEART RATE: 82 BPM | HEIGHT: 65 IN | BODY MASS INDEX: 36.06 KG/M2 | TEMPERATURE: 98 F | OXYGEN SATURATION: 97 % | RESPIRATION RATE: 18 BRPM | DIASTOLIC BLOOD PRESSURE: 72 MMHG | SYSTOLIC BLOOD PRESSURE: 110 MMHG

## 2022-02-24 DIAGNOSIS — L05.91 PILONIDAL CYST: Primary | ICD-10-CM

## 2022-02-24 PROCEDURE — 99024 POSTOP FOLLOW-UP VISIT: CPT | Performed by: COLON & RECTAL SURGERY

## 2022-02-24 NOTE — PROGRESS NOTES
Chief Complaint   Patient presents with    Follow-up     pilonidal cyst     Visit Vitals  /72 (BP 1 Location: Right arm, BP Patient Position: Sitting)   Pulse 82   Temp 98 °F (36.7 °C) (Temporal)   Resp 18   Ht 5' 5\" (1.651 m)   Wt 216 lb 6.4 oz (98.2 kg)   LMP  (LMP Unknown) Comment: menses irregular   SpO2 97%   BMI 36.01 kg/m²     1. Have you been to the ER, urgent care clinic since your last visit? Hospitalized since your last visit? No    2. Have you seen or consulted any other health care providers outside of the [address] [place] [address] since your last visit? Include any pap smears or colon screening.  No

## 2022-02-28 NOTE — PROGRESS NOTES
OFFICE VISIT NOTE    Librado Olvera is a 24 y.o. female who presents to the office today for:    Chief Complaint   Patient presents with    Follow-up     pilonidal cyst       Duvalcarmella Daniels comes in today for follow-up status post excision of pilonidal cyst on 1/31/2022. She has no complaints today. She states she feels that the wound is healing nicely. The wound is being packed twice a day with saline wet-to-dry gauze. She denies any significant pain at the site or drainage. Past Medical History:   Diagnosis Date    Anxiety     Irregular menses        Past Surgical History:   Procedure Laterality Date    HX CYST REMOVAL  01/31/2022    Pilonidal Cyst    HX HEENT      tonsilectomy    HX WISDOM TEETH EXTRACTION         Family History   Problem Relation Age of Onset    Hypertension Maternal Grandmother     Hypertension Maternal Grandfather     Diabetes Maternal Grandfather     Breast Cancer Paternal Grandmother     Hypertension Paternal Grandmother     Hypertension Paternal Grandfather     No Known Problems Mother        Social History     Socioeconomic History    Marital status:      Spouse name: Not on file    Number of children: Not on file    Years of education: Not on file    Highest education level: Not on file   Occupational History    Not on file   Tobacco Use    Smoking status: Never Smoker    Smokeless tobacco: Never Used   Vaping Use    Vaping Use: Former   Substance and Sexual Activity    Alcohol use:  Yes     Alcohol/week: 1.0 standard drink     Types: 1 Glasses of wine per week     Comment: Socially    Drug use: Never    Sexual activity: Yes     Partners: Male     Birth control/protection: Condom   Other Topics Concern    Not on file   Social History Narrative    Not on file     Social Determinants of Health     Financial Resource Strain:     Difficulty of Paying Living Expenses: Not on file   Food Insecurity:     Worried About Running Out of Food in the Last Year: Not on file    Ran Out of Food in the Last Year: Not on file   Transportation Needs:     Lack of Transportation (Medical): Not on file    Lack of Transportation (Non-Medical): Not on file   Physical Activity:     Days of Exercise per Week: Not on file    Minutes of Exercise per Session: Not on file   Stress:     Feeling of Stress : Not on file   Social Connections:     Frequency of Communication with Friends and Family: Not on file    Frequency of Social Gatherings with Friends and Family: Not on file    Attends [employer]an Services: Not on file    Active Member of [address] [place] [address] [employer] or Organizations: Not on file    Attends Club or Organization Meetings: Not on file    Marital Status: Not on file   Intimate Partner Violence:     Fear of Current or Ex-Partner: Not on file    Emotionally Abused: Not on file    Physically Abused: Not on file    Sexually Abused: Not on file   Housing Stability:     Unable to Pay for Housing in the Last Year: Not on file    Number of [place] in the Last Year: Not on file    Unstable Housing in the Last Year: Not on file       Allergies   Allergen Reactions    Coconut Rash and Swelling       Current Outpatient Medications   Medication Sig    diphenhydrAMINE (Benadryl Allergy) 25 mg tablet Take 25 mg by mouth every six (6) hours as needed. No current facility-administered medications for this visit. Review of Systems   All other systems reviewed and are negative. /72 (BP 1 Location: Right arm, BP Patient Position: Sitting)   Pulse 82   Temp 98 °F (36.7 °C) (Temporal)   Resp 18   Ht 5' 5\" (1.651 m)   Wt 216 lb 6.4 oz (98.2 kg)   LMP  (LMP Unknown) Comment: menses irregular  SpO2 97%   BMI 36.01 kg/m²     Physical Exam  Genitourinary:     Comments: On examination her open wound above the gluteal cleft is granulating nicely.   It is healing up with no evidence of sinus tracts.         Problem List Items Addressed This Visit        Integumentary    Pilonidal cyst - Primary          Assessment and Plan:    Doing well  Continue saline wet-to-dry dressings  Follow-up my office 2 weeks for wound check            Maribell De La Rosa MD

## 2022-02-28 NOTE — PROGRESS NOTES
OFFICE VISIT NOTE    Samia Sharpe is a 24 y.o. female who presents to the office today for:    Chief Complaint   Patient presents with    Post OP Follow Up     01/31/2022 Pilonidal cyst, Patient did not remove packing from surgery       Ms. Emil Kilgore comes in today for follow-up status post excision of pilonidal cyst on January 31, 2022. She has no complaints today. She does state that she has not really done much with the dressing since surgery. She denies any significant pain at the site      Past Medical History:   Diagnosis Date    Anxiety     Irregular menses        Past Surgical History:   Procedure Laterality Date    HX CYST REMOVAL  01/31/2022    Pilonidal Cyst    HX HEENT      tonsilectomy    HX WISDOM TEETH EXTRACTION         Family History   Problem Relation Age of Onset    Hypertension Maternal Grandmother     Hypertension Maternal Grandfather     Diabetes Maternal Grandfather     Breast Cancer Paternal Grandmother     Hypertension Paternal Grandmother     Hypertension Paternal Grandfather     No Known Problems Mother        Social History     Socioeconomic History    Marital status:      Spouse name: Not on file    Number of children: Not on file    Years of education: Not on file    Highest education level: Not on file   Occupational History    Not on file   Tobacco Use    Smoking status: Never Smoker    Smokeless tobacco: Never Used   Vaping Use    Vaping Use: Former   Substance and Sexual Activity    Alcohol use:  Yes     Alcohol/week: 1.0 standard drink     Types: 1 Glasses of wine per week     Comment: Socially    Drug use: Never    Sexual activity: Yes     Partners: Male     Birth control/protection: Condom   Other Topics Concern    Not on file   Social History Narrative    Not on file     Social Determinants of Health     Financial Resource Strain:  Difficulty of Paying Living Expenses: Not on file   Food Insecurity:     Worried About Running Out of Food in the Last Year: Not on file    Ran Out of Food in the Last Year: Not on file   Transportation Needs:     Lack of Transportation (Medical): Not on file    Lack of Transportation (Non-Medical): Not on file   Physical Activity:     Days of Exercise per Week: Not on file    Minutes of Exercise per Session: Not on file   Stress:     Feeling of Stress : Not on file   Social Connections:     Frequency of Communication with Friends and Family: Not on file    Frequency of Social Gatherings with Friends and Family: Not on file    Attends Methodist Services: Not on file    Active Member of [address] [place] [address] [employer] or Organizations: Not on file    Attends Club or Organization Meetings: Not on file    Marital Status: Not on file   Intimate Partner Violence:     Fear of Current or Ex-Partner: Not on file    Emotionally Abused: Not on file    Physically Abused: Not on file    Sexually Abused: Not on file   Housing Stability:     Unable to Pay for Housing in the Last Year: Not on file    Number of [place] in the Last Year: Not on file    Unstable Housing in the Last Year: Not on file       Allergies   Allergen Reactions    Coconut Rash and Swelling       Current Outpatient Medications   Medication Sig    diphenhydrAMINE (Benadryl Allergy) 25 mg tablet Take 25 mg by mouth every six (6) hours as needed. No current facility-administered medications for this visit. Review of Systems   All other systems reviewed and are negative. /82 (BP 1 Location: Left arm, BP Patient Position: Sitting)   Pulse 86   Temp 98.5 °F (36.9 °C) (Temporal)   Resp 16   Ht 5' 5\" (1.651 m)   Wt 213 lb 12.8 oz (97 kg)   LMP  (LMP Unknown) Comment: menses irregular  SpO2 97%   BMI 35.58 kg/m²     Physical Exam  Genitourinary:     Comments: On examination the vaginal packing from surgery is still in the wound.   This was removed today in the office. The wound is relatively clean although there is some drainage. Problem List Items Addressed This Visit        Integumentary    Pilonidal cyst - Primary          Assessment and Plan:    I told Ms. Odell I would like you to start doing saline wet-to-dry dressings twice daily to the wound to try to clean it up a bit. It is unfortunate that she left the packing in for this long however I believe that will heal fine.   She will follow-up with me in 1 week          Ayaan Whalen MD

## 2022-03-10 ENCOUNTER — OFFICE VISIT (OUTPATIENT)
Dept: SURGERY | Age: 22
End: 2022-03-10
Payer: COMMERCIAL

## 2022-03-10 VITALS
TEMPERATURE: 97.8 F | WEIGHT: 213 LBS | OXYGEN SATURATION: 98 % | HEART RATE: 84 BPM | SYSTOLIC BLOOD PRESSURE: 108 MMHG | HEIGHT: 65 IN | RESPIRATION RATE: 16 BRPM | DIASTOLIC BLOOD PRESSURE: 74 MMHG | BODY MASS INDEX: 35.49 KG/M2

## 2022-03-10 DIAGNOSIS — L05.91 PILONIDAL CYST: Primary | ICD-10-CM

## 2022-03-10 PROCEDURE — 99024 POSTOP FOLLOW-UP VISIT: CPT | Performed by: COLON & RECTAL SURGERY

## 2022-03-10 NOTE — PROGRESS NOTES
Chief Complaint   Patient presents with    Follow-up     Wound Check      Visit Vitals  /74 (BP 1 Location: Left arm, BP Patient Position: Sitting)   Pulse 84   Temp 97.8 °F (36.6 °C) (Temporal)   Resp 16   Ht 5' 5\" (1.651 m)   Wt 213 lb (96.6 kg)   SpO2 98%   BMI 35.45 kg/m²     1. Have you been to the ER, urgent care clinic since your last visit? Hospitalized since your last visit? No    2. Have you seen or consulted any other health care providers outside of the [address] [place] [address] since your last visit? Include any pap smears or colon screening.  No

## 2022-03-10 NOTE — PROGRESS NOTES
OFFICE VISIT NOTE    Robel Rayo is a 24 y.o. female who presents to the office today for:    Chief Complaint   Patient presents with    Follow-up     [address] comes in today for follow-up status post excision of pilonidal cyst on January 31, 2022. She has no complaints today. She has been doing saline wet-to-dry dressings to the wound. She denies any significant drainage or pain. Past Medical History:   Diagnosis Date    Anxiety     Irregular menses        Past Surgical History:   Procedure Laterality Date    HX CYST REMOVAL  01/31/2022    Pilonidal Cyst    HX HEENT      tonsilectomy    HX WISDOM TEETH EXTRACTION         Family History   Problem Relation Age of Onset    Hypertension Maternal Grandmother     Hypertension Maternal Grandfather     Diabetes Maternal Grandfather     Breast Cancer Paternal Grandmother     Hypertension Paternal Grandmother     Hypertension Paternal Grandfather     No Known Problems Mother        Social History     Socioeconomic History    Marital status:      Spouse name: Not on file    Number of children: Not on file    Years of education: Not on file    Highest education level: Not on file   Occupational History    Not on file   Tobacco Use    Smoking status: Never Smoker    Smokeless tobacco: Never Used   Vaping Use    Vaping Use: Former   Substance and Sexual Activity    Alcohol use:  Yes     Alcohol/week: 1.0 standard drink     Types: 1 Glasses of wine per week     Comment: Socially    Drug use: Never    Sexual activity: Yes     Partners: Male     Birth control/protection: Condom   Other Topics Concern    Not on file   Social History Narrative    Not on file     Social Determinants of Health     Financial Resource Strain:     Difficulty of Paying Living Expenses: Not on file   Food Insecurity:     Worried About Running Out of Food in the Last Year: Not on file    Ran Out of Food in the Last Year: Not on file   Transportation Needs:     Lack of Transportation (Medical): Not on file    Lack of Transportation (Non-Medical): Not on file   Physical Activity:     Days of Exercise per Week: Not on file    Minutes of Exercise per Session: Not on file   Stress:     Feeling of Stress : Not on file   Social Connections:     Frequency of Communication with Friends and Family: Not on file    Frequency of Social Gatherings with Friends and Family: Not on file    Attends Mormonism Services: Not on file    Active Member of [address] [place] [address] [employer] or Organizations: Not on file    Attends Club or Organization Meetings: Not on file    Marital Status: Not on file   Intimate Partner Violence:     Fear of Current or Ex-Partner: Not on file    Emotionally Abused: Not on file    Physically Abused: Not on file    Sexually Abused: Not on file   Housing Stability:     Unable to Pay for Housing in the Last Year: Not on file    Number of [place] in the Last Year: Not on file    Unstable Housing in the Last Year: Not on file       Allergies   Allergen Reactions    Coconut Rash and Swelling       Current Outpatient Medications   Medication Sig    diphenhydrAMINE (Benadryl Allergy) 25 mg tablet Take 25 mg by mouth every six (6) hours as needed. No current facility-administered medications for this visit. Review of Systems   All other systems reviewed and are negative. /74 (BP 1 Location: Left arm, BP Patient Position: Sitting)   Pulse 84   Temp 97.8 °F (36.6 °C) (Temporal)   Resp 16   Ht 5' 5\" (1.651 m)   Wt 213 lb (96.6 kg)   SpO2 98%   BMI 35.45 kg/m²     Physical Exam  Genitourinary:     Comments: Examination open wound above the gluteal cleft is healing nicely. There is excellent granulation tissue and the wound is almost flush with the skin.         Problem List Items Addressed This Visit Integumentary    Pilonidal cyst - Primary          Assessment and Plan:    Continue saline wet-to-dry twice daily  Follow-up 2 weeks          Sangeetha Santiago MD

## 2022-03-18 PROBLEM — L05.91 PILONIDAL CYST: Status: ACTIVE | Noted: 2022-01-20

## 2022-03-24 ENCOUNTER — OFFICE VISIT (OUTPATIENT)
Dept: SURGERY | Age: 22
End: 2022-03-24
Payer: COMMERCIAL

## 2022-03-24 VITALS
WEIGHT: 216.8 LBS | HEIGHT: 65 IN | DIASTOLIC BLOOD PRESSURE: 68 MMHG | HEART RATE: 94 BPM | OXYGEN SATURATION: 97 % | SYSTOLIC BLOOD PRESSURE: 104 MMHG | RESPIRATION RATE: 16 BRPM | TEMPERATURE: 97.6 F | BODY MASS INDEX: 36.12 KG/M2

## 2022-03-24 DIAGNOSIS — L05.91 PILONIDAL CYST: Primary | ICD-10-CM

## 2022-03-24 PROCEDURE — 99213 OFFICE O/P EST LOW 20 MIN: CPT | Performed by: COLON & RECTAL SURGERY

## 2022-03-24 NOTE — PROGRESS NOTES
Chief Complaint   Patient presents with    Follow-up     Wound Check      Visit Vitals  /68 (BP 1 Location: Left arm, BP Patient Position: Sitting)   Pulse 94   Temp 97.6 °F (36.4 °C) (Temporal)   Resp 16   Ht 5' 5\" (1.651 m)   Wt 216 lb 12.8 oz (98.3 kg)   SpO2 97%   BMI 36.08 kg/m²     1. Have you been to the ER, urgent care clinic since your last visit? Hospitalized since your last visit? No    2. Have you seen or consulted any other health care providers outside of the [address] [place] [address] since your last visit? Include any pap smears or colon screening.  No

## 2022-03-26 NOTE — PROGRESS NOTES
OFFICE VISIT NOTE    Iman Rivas is a 24 y.o. female who presents to the office today for:    Chief Complaint   Patient presents with    Follow-up     [address] comes in today for follow-up status post excision of pilonidal cyst on January 31, 2022. She has no complaints today. She has been continue to the packed the wound with saline wet-to-dry. She states she feels it is almost healed up. She denies any drainage or pain at the site. Past Medical History:   Diagnosis Date    Anxiety     Irregular menses        Past Surgical History:   Procedure Laterality Date    HX CYST REMOVAL  01/31/2022    Pilonidal Cyst    HX HEENT      tonsilectomy    HX WISDOM TEETH EXTRACTION         Family History   Problem Relation Age of Onset    Hypertension Maternal Grandmother     Hypertension Maternal Grandfather     Diabetes Maternal Grandfather     Breast Cancer Paternal Grandmother     Hypertension Paternal Grandmother     Hypertension Paternal Grandfather     No Known Problems Mother        Social History     Socioeconomic History    Marital status:      Spouse name: Not on file    Number of children: Not on file    Years of education: Not on file    Highest education level: Not on file   Occupational History    Not on file   Tobacco Use    Smoking status: Never Smoker    Smokeless tobacco: Never Used   Vaping Use    Vaping Use: Former   Substance and Sexual Activity    Alcohol use:  Yes     Alcohol/week: 1.0 standard drink     Types: 1 Glasses of wine per week     Comment: Socially    Drug use: Never    Sexual activity: Yes     Partners: Male     Birth control/protection: Condom   Other Topics Concern    Not on file   Social History Narrative    Not on file     Social Determinants of Health     Financial Resource Strain:     Difficulty of Paying Living Expenses: Not on file   Food Insecurity:     Worried About Running Out of Food in the Last Year: Not on file    Hi of Food in the Last Year: Not on file   Transportation Needs:     Lack of Transportation (Medical): Not on file    Lack of Transportation (Non-Medical): Not on file   Physical Activity:     Days of Exercise per Week: Not on file    Minutes of Exercise per Session: Not on file   Stress:     Feeling of Stress : Not on file   Social Connections:     Frequency of Communication with Friends and Family: Not on file    Frequency of Social Gatherings with Friends and Family: Not on file    Attends Worship Services: Not on file    Active Member of [employer] or Organizations: Not on file    Attends Club or Organization Meetings: Not on file    Marital Status: Not on file   Intimate Partner Violence:     Fear of Current or Ex-Partner: Not on file    Emotionally Abused: Not on file    Physically Abused: Not on file    Sexually Abused: Not on file   Housing Stability:     Unable to Pay for Housing in the Last Year: Not on file    Number of [place] in the Last Year: Not on file    Unstable Housing in the Last Year: Not on file       Allergies   Allergen Reactions    Coconut Rash and Swelling       Current Outpatient Medications   Medication Sig    diphenhydrAMINE (Benadryl Allergy) 25 mg tablet Take 25 mg by mouth every six (6) hours as needed. No current facility-administered medications for this visit. Review of Systems   All other systems reviewed and are negative. /68 (BP 1 Location: Left arm, BP Patient Position: Sitting)   Pulse 94   Temp 97.6 °F (36.4 °C) (Temporal)   Resp 16   Ht 5' 5\" (1.651 m)   Wt 216 lb 12.8 oz (98.3 kg)   SpO2 97%   BMI 36.08 kg/m²     Physical Exam  Genitourinary:     Comments: On examination her open wound above the gluteal cleft is healing nicely. There is excellent granulation tissue.   Appreciate no sinus tracts or evidence of recurrent pilonidal.        Problem List Items Addressed This Visit        Integumentary    Pilonidal cyst - Primary          Assessment and Plan:   continue current dressings  Follow-up 1 week              Brian Begum MD

## 2022-03-31 ENCOUNTER — OFFICE VISIT (OUTPATIENT)
Dept: SURGERY | Age: 22
End: 2022-03-31
Payer: COMMERCIAL

## 2022-03-31 VITALS
DIASTOLIC BLOOD PRESSURE: 78 MMHG | WEIGHT: 221.2 LBS | OXYGEN SATURATION: 97 % | BODY MASS INDEX: 36.85 KG/M2 | HEART RATE: 101 BPM | RESPIRATION RATE: 18 BRPM | SYSTOLIC BLOOD PRESSURE: 110 MMHG | HEIGHT: 65 IN | TEMPERATURE: 98.3 F

## 2022-03-31 DIAGNOSIS — L05.91 PILONIDAL CYST: Primary | ICD-10-CM

## 2022-03-31 PROCEDURE — 99213 OFFICE O/P EST LOW 20 MIN: CPT | Performed by: COLON & RECTAL SURGERY

## 2022-04-07 ENCOUNTER — OFFICE VISIT (OUTPATIENT)
Dept: SURGERY | Age: 22
End: 2022-04-07
Payer: COMMERCIAL

## 2022-04-07 VITALS
BODY MASS INDEX: 36.49 KG/M2 | SYSTOLIC BLOOD PRESSURE: 98 MMHG | WEIGHT: 219 LBS | RESPIRATION RATE: 16 BRPM | OXYGEN SATURATION: 98 % | HEIGHT: 65 IN | TEMPERATURE: 99.1 F | HEART RATE: 96 BPM | DIASTOLIC BLOOD PRESSURE: 72 MMHG

## 2022-04-07 DIAGNOSIS — L05.91 PILONIDAL CYST: Primary | ICD-10-CM

## 2022-04-07 PROCEDURE — 99213 OFFICE O/P EST LOW 20 MIN: CPT | Performed by: COLON & RECTAL SURGERY

## 2022-04-14 ENCOUNTER — OFFICE VISIT (OUTPATIENT)
Dept: SURGERY | Age: 22
End: 2022-04-14
Payer: COMMERCIAL

## 2022-04-14 VITALS
SYSTOLIC BLOOD PRESSURE: 105 MMHG | WEIGHT: 222.2 LBS | HEIGHT: 65 IN | BODY MASS INDEX: 37.02 KG/M2 | RESPIRATION RATE: 18 BRPM | DIASTOLIC BLOOD PRESSURE: 60 MMHG | OXYGEN SATURATION: 97 % | TEMPERATURE: 97.6 F | HEART RATE: 82 BPM

## 2022-04-14 DIAGNOSIS — L05.91 PILONIDAL CYST: Primary | ICD-10-CM

## 2022-04-14 PROCEDURE — 99024 POSTOP FOLLOW-UP VISIT: CPT | Performed by: COLON & RECTAL SURGERY

## 2022-04-14 NOTE — PROGRESS NOTES
Chief Complaint   Patient presents with    Follow-up     wound check     Visit Vitals  /60 (BP 1 Location: Right arm, BP Patient Position: Sitting)   Pulse 82   Temp 97.6 °F (36.4 °C) (Temporal)   Resp 18   Ht 5' 5\" (1.651 m)   Wt 222 lb 3.2 oz (100.8 kg)   SpO2 97%   BMI 36.98 kg/m²     . 1. Have you been to the ER, urgent care clinic since your last visit? Hospitalized since your last visit? No    2. Have you seen or consulted any other health care providers outside of the [address] [place] [address] since your last visit? Include any pap smears or colon screening.  No

## 2022-05-05 ENCOUNTER — OFFICE VISIT (OUTPATIENT)
Dept: SURGERY | Age: 22
End: 2022-05-05
Payer: COMMERCIAL

## 2022-05-05 VITALS
HEIGHT: 65 IN | SYSTOLIC BLOOD PRESSURE: 98 MMHG | BODY MASS INDEX: 37.15 KG/M2 | TEMPERATURE: 97.6 F | OXYGEN SATURATION: 97 % | RESPIRATION RATE: 16 BRPM | HEART RATE: 83 BPM | DIASTOLIC BLOOD PRESSURE: 70 MMHG | WEIGHT: 223 LBS

## 2022-05-05 DIAGNOSIS — L05.91 PILONIDAL CYST: Primary | ICD-10-CM

## 2022-05-05 PROCEDURE — 99213 OFFICE O/P EST LOW 20 MIN: CPT | Performed by: COLON & RECTAL SURGERY

## 2022-05-26 ENCOUNTER — OFFICE VISIT (OUTPATIENT)
Dept: SURGERY | Age: 22
End: 2022-05-26
Payer: COMMERCIAL

## 2022-05-26 VITALS
TEMPERATURE: 97.8 F | HEIGHT: 65 IN | BODY MASS INDEX: 37.29 KG/M2 | SYSTOLIC BLOOD PRESSURE: 102 MMHG | DIASTOLIC BLOOD PRESSURE: 60 MMHG | WEIGHT: 223.8 LBS | OXYGEN SATURATION: 97 % | RESPIRATION RATE: 18 BRPM | HEART RATE: 86 BPM

## 2022-05-26 DIAGNOSIS — L05.91 PILONIDAL CYST: Primary | ICD-10-CM

## 2022-05-26 PROCEDURE — 99213 OFFICE O/P EST LOW 20 MIN: CPT | Performed by: COLON & RECTAL SURGERY

## 2022-05-26 NOTE — LETTER
5/26/2022    Patient: Indra Mott   YOB: 2000   Date of Visit: 5/26/2022     Whiltey Pond MD  [facility] [phone]  Via Fax: [address] [phone]  Via In Basket    Dear MD Alireza Hatfield MD,      Thank you for referring Ms. Indra Mott to [address] [place] [address] for evaluation. My notes for this consultation are attached. If you have questions, please do not hesitate to call me. I look forward to following your patient along with you.       Sincerely,    Chantel Ahmadi MD

## 2022-05-26 NOTE — PROGRESS NOTES
OFFICE VISIT NOTE    Maren Mendoza is a 24 y.o. female who presents to the office today for:    Chief Complaint   Patient presents with    Follow-up     wound check        Angie Hart comes in today for follow-up status post excision of pilonidal cyst on January 31, 2022. Her wound has been healing by secondary intent. It was a fairly deep wound given the size of a pilonidal.  At her last visit it was almost completely healed. Today she denies any drainage. She denies any pain at the site. Past Medical History:   Diagnosis Date    Anxiety     Irregular menses        Past Surgical History:   Procedure Laterality Date    HX CYST REMOVAL  01/31/2022    Pilonidal Cyst    HX HEENT      tonsilectomy    HX WISDOM TEETH EXTRACTION         Family History   Problem Relation Age of Onset    Hypertension Maternal Grandmother     Hypertension Maternal Grandfather     Diabetes Maternal Grandfather     Breast Cancer Paternal Grandmother     Hypertension Paternal Grandmother     Hypertension Paternal Grandfather     No Known Problems Mother        Social History     Socioeconomic History    Marital status:      Spouse name: Not on file    Number of children: Not on file    Years of education: Not on file    Highest education level: Not on file   Occupational History    Not on file   Tobacco Use    Smoking status: Never Smoker    Smokeless tobacco: Never Used   Vaping Use    Vaping Use: Former   Substance and Sexual Activity    Alcohol use:  Yes     Alcohol/week: 1.0 standard drink     Types: 1 Glasses of wine per week     Comment: Socially    Drug use: Never    Sexual activity: Yes     Partners: Male     Birth control/protection: Condom   Other Topics Concern    Not on file   Social History Narrative    Not on file     Social Determinants of Health     Financial Resource Strain:  Difficulty of Paying Living Expenses: Not on file   Food Insecurity:     Worried About Running Out of Food in the Last Year: Not on file    Ran Out of Food in the Last Year: Not on file   Transportation Needs:     Lack of Transportation (Medical): Not on file    Lack of Transportation (Non-Medical): Not on file   Physical Activity:     Days of Exercise per Week: Not on file    Minutes of Exercise per Session: Not on file   Stress:     Feeling of Stress : Not on file   Social Connections:     Frequency of Communication with Friends and Family: Not on file    Frequency of Social Gatherings with Friends and Family: Not on file    Attends Bahai Services: Not on file    Active Member of [address] [place] [address] [employer] or Organizations: Not on file    Attends Club or Organization Meetings: Not on file    Marital Status: Not on file   Intimate Partner Violence:     Fear of Current or Ex-Partner: Not on file    Emotionally Abused: Not on file    Physically Abused: Not on file    Sexually Abused: Not on file   Housing Stability:     Unable to Pay for Housing in the Last Year: Not on file    Number of [place] in the Last Year: Not on file    Unstable Housing in the Last Year: Not on file       Allergies   Allergen Reactions    Coconut Rash and Swelling       Current Outpatient Medications   Medication Sig    diphenhydrAMINE (Benadryl Allergy) 25 mg tablet Take 25 mg by mouth every six (6) hours as needed. No current facility-administered medications for this visit. Review of Systems   All other systems reviewed and are negative. /60 (BP 1 Location: Right arm, BP Patient Position: Sitting)   Pulse 86   Temp 97.8 °F (36.6 °C) (Temporal)   Resp 18   Ht 5' 5\" (1.651 m)   Wt 223 lb 12.8 oz (101.5 kg)   SpO2 97%   BMI 37.24 kg/m²     Physical Exam  Genitourinary:     Comments: Wound above the gluteal cleft is almost completely epithelialized. There is no evidence of sinus tracts.         Problem List Items Addressed This Visit        Integumentary    Pilonidal cyst - Primary          Assessment and Plan:  I told Bogdan Coley that I am pleased that her wound is healing nicely. I expect the wound to be completely closed in the next week or so. She will follow-up with me on an as needed basis.               Lilliana Lorenzana MD

## 2022-05-26 NOTE — PROGRESS NOTES
Chief Complaint   Patient presents with    Follow-up     wound check      Visit Vitals  /60 (BP 1 Location: Right arm, BP Patient Position: Sitting)   Pulse 86   Temp 97.8 °F (36.6 °C) (Temporal)   Resp 18   Ht 5' 5\" (1.651 m)   Wt 223 lb 12.8 oz (101.5 kg)   SpO2 97%   BMI 37.24 kg/m²     1. Have you been to the ER, urgent care clinic since your last visit? Hospitalized since your last visit? No    2. Have you seen or consulted any other health care providers outside of the [address] [place] [address] since your last visit? Include any pap smears or colon screening.  No

## 2022-07-22 ENCOUNTER — OFFICE VISIT (OUTPATIENT)
Dept: OBGYN CLINIC | Age: 22
End: 2022-07-22
Payer: COMMERCIAL

## 2022-07-22 VITALS
HEIGHT: 65 IN | DIASTOLIC BLOOD PRESSURE: 79 MMHG | SYSTOLIC BLOOD PRESSURE: 138 MMHG | WEIGHT: 226.38 LBS | BODY MASS INDEX: 37.72 KG/M2

## 2022-07-22 DIAGNOSIS — Z11.51 SCREENING FOR HUMAN PAPILLOMAVIRUS: ICD-10-CM

## 2022-07-22 DIAGNOSIS — Z11.3 SCREENING FOR VENEREAL DISEASE: ICD-10-CM

## 2022-07-22 DIAGNOSIS — Z01.419 PAP SMEAR, AS PART OF ROUTINE GYNECOLOGICAL EXAMINATION: Primary | ICD-10-CM

## 2022-07-22 PROCEDURE — 99395 PREV VISIT EST AGE 18-39: CPT | Performed by: OBSTETRICS & GYNECOLOGY

## 2022-07-22 NOTE — PROGRESS NOTES
Marian Lala is a 20 y.o. female who presents today for the following:  Chief Complaint   Patient presents with    Annual Exam     Pt presents for annual exam and to discuss CHRISTOPHER //Fabrice     Family Planning        Allergies   Allergen Reactions    Coconut Rash and Swelling       Current Outpatient Medications   Medication Sig    diphenhydrAMINE (Benadryl Allergy) 25 mg tablet Take 25 mg by mouth every six (6) hours as needed. No current facility-administered medications for this visit. Past Medical History:   Diagnosis Date    Anxiety     Irregular menses        Past Surgical History:   Procedure Laterality Date    HX CYST REMOVAL  01/31/2022    Pilonidal Cyst    HX HEENT      tonsilectomy    HX WISDOM TEETH EXTRACTION         Family History   Problem Relation Age of Onset    Hypertension Maternal Grandmother     Hypertension Maternal Grandfather     Diabetes Maternal Grandfather     Breast Cancer Paternal Grandmother     Hypertension Paternal Grandmother     Hypertension Paternal Grandfather     No Known Problems Mother        Social History     Socioeconomic History    Marital status:      Spouse name: Not on file    Number of children: Not on file    Years of education: Not on file    Highest education level: Not on file   Occupational History    Not on file   Tobacco Use    Smoking status: Never    Smokeless tobacco: Never   Vaping Use    Vaping Use: Former   Substance and Sexual Activity    Alcohol use:  Yes     Alcohol/week: 1.0 standard drink     Types: 1 Glasses of wine per week     Comment: Socially    Drug use: Never    Sexual activity: Yes     Partners: Male     Birth control/protection: Condom   Other Topics Concern    Not on file   Social History Narrative    Not on file     Social Determinants of Health     Financial Resource Strain: Not on file   Food Insecurity: Not on file   Transportation Needs: Not on file   Physical Activity: Not on file   Stress: Not on file   Social Connections: Not on file   Intimate Partner Violence: Not on file   Housing Stability: Not on file         ROS   Review of Systems   Constitutional: Negative. HENT: Negative. Eyes: Negative. Respiratory: Negative. Cardiovascular: Negative. Gastrointestinal: Negative. Genitourinary: Negative. Musculoskeletal: Negative. Skin: Negative. Neurological: Negative. Endo/Heme/Allergies: Negative. Psychiatric/Behavioral: Negative. /79   Ht 5' 5\" (1.651 m)   Wt 226 lb 6 oz (102.7 kg)   BMI 37.67 kg/m²    OBGyn Exam   Constitutional  Appearance: well-nourished, well developed, alert, in no acute distress    HENT  Head and Face: appears normal    Neck  Inspection/Palpation: normal appearance, no masses or tenderness  Lymph Nodes: no lymphadenopathy present  Thyroid: gland size normal, nontender, no nodules or masses present on palpation    Breasts  Symmetric, no palpable masses, no tenderness, no skin changes, no nipple abnormality, no nipple discharge, no lymphadenopathy. Chest  Respiratory Effort: breathing labored  Auscultation: normal breath sounds    Cardiovascular  Heart:   Auscultation: regular rate and rhythm without murmur    Gastrointestinal  Abdominal Examination: abdomen non-tender to palpation, normal bowel sounds, no masses present  Liver and spleen: no hepatomegaly present, spleen not palpable  Hernias: no hernias identified    Genitourinary  External Genitalia: normal appearance for age, no discharge present, no tenderness present, no inflammatory lesions present, no masses present, no atrophy present  Vagina: normal vaginal vault without central or paravaginal defects, no discharge present, no inflammatory lesions present, no masses present  Bladder: non-tender to palpation  Urethra: appears normal  Cervix: normal   Uterus: normal size, shape and consistency  Adnexa: no adnexal tenderness present, no adnexal masses present  Perineum: perineum within normal limits, no evidence of trauma, no rashes or skin lesions present  Anus: anus within normal limits, no hemorrhoids present  Inguinal Lymph Nodes: no lymphadenopathy present    Skin  General Inspection: no rash, no lesions identified    Neurologic/Psychiatric  Mental Status:  Orientation: grossly oriented to person, place and time  Mood and Affect: mood normal, affect appropriate    No results found for this visit on 07/22/22. Orders Placed This Encounter    PAP IG, CT-NG-TV, RFX APTIMA HPV ASCUS ([id number])     Order Specific Question:   Pap Source? Answer:   Cervical     Order Specific Question:   Total Hysterectomy? Answer:   No     Order Specific Question:   Supracervical Hysterectomy? Answer:   No     Order Specific Question:   Post Menopausal?     Answer:   No     Order Specific Question:   Hormone Therapy? Answer:   No     Order Specific Question:   IUD? Answer:   No     Order Specific Question:   Abnormal Bleeding? Answer:   No     Order Specific Question:   Pregnant     Answer:   No     Order Specific Question:   Post Partum? Answer:   No     21 yo ANNUAL    1. Pap smear, as part of routine gynecological examination    - PAP IG, CT-NG-TV, RFX APTIMA HPV ASCUS ([id number])    2. Screening for human papillomavirus    - PAP IG, CT-NG-TV, RFX APTIMA HPV ASCUS ([id number])    3.  Screening for venereal disease    - PAP IG, CT-NG-TV, RFX APTIMA HPV ASCUS ([id number])

## 2022-07-29 LAB
C TRACH RRNA CVX QL NAA+PROBE: NEGATIVE
CYTOLOGIST CVX/VAG CYTO: ABNORMAL
CYTOLOGY CVX/VAG DOC CYTO: ABNORMAL
CYTOLOGY CVX/VAG DOC THIN PREP: ABNORMAL
DX ICD CODE: ABNORMAL
DX ICD CODE: ABNORMAL
HPV I/H RISK 4 DNA CVX QL PROBE+SIG AMP: NEGATIVE
LABCORP, 190119: ABNORMAL
Lab: ABNORMAL
N GONORRHOEA RRNA CVX QL NAA+PROBE: NEGATIVE
OTHER STN SPEC: ABNORMAL
PATHOLOGIST CVX/VAG CYTO: ABNORMAL
STAT OF ADQ CVX/VAG CYTO-IMP: ABNORMAL
T VAGINALIS RRNA SPEC QL NAA+PROBE: NEGATIVE

## 2022-09-27 ENCOUNTER — TELEPHONE (OUTPATIENT)
Dept: OBGYN CLINIC | Age: 22
End: 2022-09-27

## 2023-03-22 ENCOUNTER — OFFICE VISIT (OUTPATIENT)
Dept: OBGYN CLINIC | Age: 23
End: 2023-03-22
Payer: COMMERCIAL

## 2023-03-22 VITALS
WEIGHT: 226.25 LBS | BODY MASS INDEX: 37.7 KG/M2 | HEIGHT: 65 IN | DIASTOLIC BLOOD PRESSURE: 75 MMHG | SYSTOLIC BLOOD PRESSURE: 122 MMHG

## 2023-03-22 DIAGNOSIS — N91.2 AMENORRHEA: Primary | ICD-10-CM

## 2023-03-22 PROCEDURE — 99213 OFFICE O/P EST LOW 20 MIN: CPT | Performed by: OBSTETRICS & GYNECOLOGY

## 2023-03-22 RX ORDER — MEDROXYPROGESTERONE ACETATE 10 MG/1
10 TABLET ORAL DAILY
Qty: 30 TABLET | Refills: 5 | Status: SHIPPED | OUTPATIENT
Start: 2023-03-22

## 2023-03-22 NOTE — PROGRESS NOTES
Nannette Eli is a 20 y.o. female who presents today for the following:  Chief Complaint   Patient presents with    Family Planning     Pt presents to discuss trying to conceive. Pt states  saw doctor for low morphology and was told pt should be put on clomid //MKeeley         Allergies   Allergen Reactions    Coconut Rash and Swelling       Current Outpatient Medications   Medication Sig    medroxyPROGESTERone (PROVERA) 10 mg tablet Take 1 Tablet by mouth daily. diphenhydrAMINE (Benadryl Allergy) 25 mg tablet Take 25 mg by mouth every six (6) hours as needed. No current facility-administered medications for this visit. Past Medical History:   Diagnosis Date    Anxiety     Irregular menses        Past Surgical History:   Procedure Laterality Date    HX CYST REMOVAL  01/31/2022    Pilonidal Cyst    HX HEENT      tonsilectomy    HX WISDOM TEETH EXTRACTION         Family History   Problem Relation Age of Onset    Hypertension Maternal Grandmother     Hypertension Maternal Grandfather     Diabetes Maternal Grandfather     Breast Cancer Paternal Grandmother     Hypertension Paternal Grandmother     Hypertension Paternal Grandfather     No Known Problems Mother        Social History     Socioeconomic History    Marital status:      Spouse name: Not on file    Number of children: Not on file    Years of education: Not on file    Highest education level: Not on file   Occupational History    Not on file   Tobacco Use    Smoking status: Never    Smokeless tobacco: Never   Vaping Use    Vaping Use: Former   Substance and Sexual Activity    Alcohol use:  Yes     Alcohol/week: 1.0 standard drink     Types: 1 Glasses of wine per week     Comment: Socially    Drug use: Never    Sexual activity: Yes     Partners: Male     Birth control/protection: Condom   Other Topics Concern    Not on file   Social History Narrative    Not on file     Social Determinants of Health     Financial Resource Strain: Not on file   Food Insecurity: Not on file   Transportation Needs: Not on file   Physical Activity: Not on file   Stress: Not on file   Social Connections: Not on file   Intimate Partner Violence: Not on file   Housing Stability: Not on file         ROS   Review of Systems   Constitutional: Negative. HENT: Negative. Eyes: Negative. Respiratory: Negative. Cardiovascular: Negative. Gastrointestinal: Negative. Genitourinary: Negative. Musculoskeletal: Negative. Skin: Negative. Neurological: Negative. Endo/Heme/Allergies: Negative. Psychiatric/Behavioral: Negative. /75   Ht 5' 5\" (1.651 m)   Wt 226 lb 4 oz (102.6 kg)   BMI 37.65 kg/m²    OBGyn Exam   Constitutional  Appearance: well-nourished, well developed, alert, in no acute distress    HENT  Head and Face: appears normal    Chest  Respiratory Effort: breathing labored    Gastrointestinal  Abdominal Examination: abdomen non-tender to palpation, normal bowel sounds, no masses present    Genitourinary  deferred    Skin  General Inspection: no rash, no lesions identified    Neurologic/Psychiatric  Mental Status:  Orientation: grossly oriented to person, place and time  Mood and Affect: mood normal, affect appropriate    No results found for this visit on 03/22/23. Orders Placed This Encounter    US PELV NON OB W TV     Standing Status:   Future     Standing Expiration Date:   4/22/2023     Order Specific Question:   Is Patient Pregnant? Answer:   No    medroxyPROGESTERone (PROVERA) 10 mg tablet     Sig: Take 1 Tablet by mouth daily. Dispense:  30 Tablet     Refill:  5     23 YO WITH AMENORRHEA, TRYING TO CONCEIVE  - ,  SEEN BY UROLOGY DUE TO ABN SPERM ANALYSIS    1. Amenorrhea  - DISCUSSED POC  - WILL TRY TO INDUCE PERIODS  - WILL START ON PROVERA FOR WITHDRAWAL BLEED, IF NO RESPONSE AFTER 4 WEEKS WILL DO ESTROGEN, THE PROVERA  - medroxyPROGESTERone (PROVERA) 10 mg tablet; Take 1 Tablet by mouth daily. Dispense: 30 Tablet; Refill: 5  - US PELV NON OB W TV; Future        Follow-up and Dispositions    Return in about 4 weeks (around 4/19/2023).

## 2023-04-17 ENCOUNTER — OFFICE VISIT (OUTPATIENT)
Dept: OBGYN CLINIC | Age: 23
End: 2023-04-17
Payer: COMMERCIAL

## 2023-04-17 VITALS
SYSTOLIC BLOOD PRESSURE: 125 MMHG | DIASTOLIC BLOOD PRESSURE: 74 MMHG | HEIGHT: 65 IN | WEIGHT: 230 LBS | BODY MASS INDEX: 38.32 KG/M2

## 2023-04-17 DIAGNOSIS — N97.0 ANOVULATION: Primary | ICD-10-CM

## 2023-04-17 DIAGNOSIS — N91.2 AMENORRHEA: ICD-10-CM

## 2023-04-17 PROCEDURE — 99213 OFFICE O/P EST LOW 20 MIN: CPT | Performed by: OBSTETRICS & GYNECOLOGY

## 2023-04-17 RX ORDER — MEDROXYPROGESTERONE ACETATE 10 MG/1
10 TABLET ORAL DAILY
Qty: 10 TABLET | Refills: 3 | Status: SHIPPED | OUTPATIENT
Start: 2023-04-17

## 2023-04-17 NOTE — PROGRESS NOTES
Yanira Oseguera is a 20 y.o. female who presents today for the following:  Chief Complaint   Patient presents with    Family Planning     Pt presents for 4 week follow up, pt started on provera, had a 10 day cycle this month //Fabrice     Medication Check        Allergies   Allergen Reactions    Coconut Rash and Swelling       Current Outpatient Medications   Medication Sig    medroxyPROGESTERone (PROVERA) 10 mg tablet Take 1 Tablet by mouth daily. diphenhydrAMINE (Benadryl Allergy) 25 mg tablet Take 25 mg by mouth every six (6) hours as needed. No current facility-administered medications for this visit. Past Medical History:   Diagnosis Date    Anxiety     Irregular menses        Past Surgical History:   Procedure Laterality Date    HX CYST REMOVAL  01/31/2022    Pilonidal Cyst    HX HEENT      tonsilectomy    HX WISDOM TEETH EXTRACTION         Family History   Problem Relation Age of Onset    Hypertension Maternal Grandmother     Hypertension Maternal Grandfather     Diabetes Maternal Grandfather     Breast Cancer Paternal Grandmother     Hypertension Paternal Grandmother     Hypertension Paternal Grandfather     No Known Problems Mother        Social History     Socioeconomic History    Marital status:      Spouse name: Not on file    Number of children: Not on file    Years of education: Not on file    Highest education level: Not on file   Occupational History    Not on file   Tobacco Use    Smoking status: Never    Smokeless tobacco: Never   Vaping Use    Vaping Use: Former   Substance and Sexual Activity    Alcohol use:  Yes     Alcohol/week: 1.0 standard drink     Types: 1 Glasses of wine per week     Comment: Socially    Drug use: Never    Sexual activity: Yes     Partners: Male     Birth control/protection: Condom   Other Topics Concern    Not on file   Social History Narrative    Not on file     Social Determinants of Health     Financial Resource Strain: Not on file   Food Insecurity: Not on file   Transportation Needs: Not on file   Physical Activity: Not on file   Stress: Not on file   Social Connections: Not on file   Intimate Partner Violence: Not on file   Housing Stability: Not on file         ROS   Review of Systems   Constitutional: Negative. HENT: Negative. Eyes: Negative. Respiratory: Negative. Cardiovascular: Negative. Gastrointestinal: Negative. Genitourinary: Negative. Musculoskeletal: Negative. Skin: Negative. Neurological: Negative. Endo/Heme/Allergies: Negative. Psychiatric/Behavioral: Negative. /74   Ht 5' 5\" (1.651 m)   Wt 230 lb (104.3 kg)   LMP 04/07/2023   BMI 38.27 kg/m²    OBGyn Exam   Constitutional  Appearance: well-nourished, well developed, alert, in no acute distress    HENT  Head and Face: appears normal    Chest  Respiratory Effort: breathing labored    Gastrointestinal  Abdominal Examination: abdomen non-tender to palpation, normal bowel sounds, no masses present    Genitourinary  deferred    Skin  General Inspection: no rash, no lesions identified    Neurologic/Psychiatric  Mental Status:  Orientation: grossly oriented to person, place and time  Mood and Affect: mood normal, affect appropriate    No results found for this visit on 04/17/23. No orders of the defined types were placed in this encounter. 23 YO WITH AMENORRHEA  WAS GIVEN PROVERA AND IT STARTED PERIOD X 10 DAYS   WITH 3% OF NORMAL SPERM, SEEN BY UROLOGY    1. Anovulation  - DISCUSSED FINDINGS  WILL SEND PROVERA  - WILL COME ON April 28TH FOR PROGESTERONE LEVEL        Follow-up and Dispositions    Return in about 4 weeks (around 5/15/2023).

## 2023-04-29 LAB — PROGEST SERPL-MCNC: 0.1 NG/ML

## 2023-05-16 ENCOUNTER — OFFICE VISIT (OUTPATIENT)
Age: 23
End: 2023-05-16
Payer: COMMERCIAL

## 2023-05-16 VITALS
SYSTOLIC BLOOD PRESSURE: 123 MMHG | WEIGHT: 232.5 LBS | HEIGHT: 65 IN | BODY MASS INDEX: 38.74 KG/M2 | DIASTOLIC BLOOD PRESSURE: 76 MMHG

## 2023-05-16 DIAGNOSIS — E28.9 OVARIAN DYSFUNCTION: Primary | ICD-10-CM

## 2023-05-16 DIAGNOSIS — E28.9 OVARIAN DYSFUNCTION: ICD-10-CM

## 2023-05-16 DIAGNOSIS — E66.09 CLASS 2 OBESITY DUE TO EXCESS CALORIES WITHOUT SERIOUS COMORBIDITY WITH BODY MASS INDEX (BMI) OF 38.0 TO 38.9 IN ADULT: ICD-10-CM

## 2023-05-16 PROCEDURE — 99213 OFFICE O/P EST LOW 20 MIN: CPT | Performed by: OBSTETRICS & GYNECOLOGY

## 2023-05-16 RX ORDER — MEDROXYPROGESTERONE ACETATE 10 MG/1
10 TABLET ORAL DAILY
COMMUNITY
Start: 2023-04-17 | End: 2023-05-16

## 2023-05-16 RX ORDER — MEDROXYPROGESTERONE ACETATE 10 MG/1
10 TABLET ORAL DAILY
Qty: 30 TABLET | Refills: 5 | Status: SHIPPED | OUTPATIENT
Start: 2023-05-16

## 2023-05-16 RX ORDER — MEDROXYPROGESTERONE ACETATE 10 MG/1
TABLET ORAL
COMMUNITY
Start: 2023-05-14 | End: 2023-05-16 | Stop reason: SDUPTHER

## 2023-05-16 NOTE — PROGRESS NOTES
Toña Ortiz is a 20 y.o. female who presents today for the following:  Chief Complaint   Patient presents with    Results     Pt presents for lab results and to discuss POC for EDWARD //Fredis     Other        Allergies   Allergen Reactions    Coconut Fatty Acids Rash and Swelling       Current Outpatient Medications   Medication Sig Dispense Refill    medroxyPROGESTERone (PROVERA) 10 MG tablet Take 1 tablet by mouth daily 30 tablet 5    clomiPHENE (CLOMID) 50 MG tablet 2 pills po day # 3 - 7 of LMP 30 tablet 3    diphenhydrAMINE (SOMINEX) 25 MG tablet Take 1 tablet by mouth every 6 hours as needed       No current facility-administered medications for this visit. Past Medical History:   Diagnosis Date    Anxiety     Irregular menses        Past Surgical History:   Procedure Laterality Date    CYST REMOVAL  01/31/2022    Pilonidal Cyst    HEENT      tonsilectomy    WISDOM TOOTH EXTRACTION         Family History   Problem Relation Age of Onset    Hypertension Paternal Grandmother     No Known Problems Mother     Hypertension Paternal Grandfather     Breast Cancer Paternal Grandmother     Hypertension Maternal Grandfather     Diabetes Maternal Grandfather     Hypertension Maternal Grandmother        Social History     Socioeconomic History    Marital status:      Spouse name: Not on file    Number of children: Not on file    Years of education: Not on file    Highest education level: Not on file   Occupational History    Not on file   Tobacco Use    Smoking status: Never    Smokeless tobacco: Never   Substance and Sexual Activity    Alcohol use:  Yes     Alcohol/week: 1.0 standard drink    Drug use: Never    Sexual activity: Not on file   Other Topics Concern    Not on file   Social History Narrative    Not on file     Social Determinants of Health     Financial Resource Strain: Not on file   Food Insecurity: Not on file   Transportation Needs: Not on file   Physical Activity: Not on file   Stress: Not on

## 2023-07-10 ENCOUNTER — TELEPHONE (OUTPATIENT)
Age: 23
End: 2023-07-10

## 2023-07-10 NOTE — TELEPHONE ENCOUNTER
Returned a call to the patient regarding a triage voicemail she left on 06/08/23. Apologized to the patient regarding calling her back so late and explained we weren't receiving the voicemails in the office. Patient states she experienced a rash when taking Clomid and is wondering it that is a side effect. State she took Benadryl and it went away. Advised her I would forward her message to Dr Girish Driver.

## 2023-07-11 NOTE — TELEPHONE ENCOUNTER
Spoke with the patient and advised her per Dr Daphney Lyman, a rash is usually not associated as a side effect with Clomid and she should take it with her next cycle but make sure to takes Benadryl prn.

## 2023-07-31 ENCOUNTER — HOSPITAL ENCOUNTER (INPATIENT)
Facility: HOSPITAL | Age: 23
LOS: 2 days | Discharge: HOME | DRG: 885 | End: 2023-08-03
Attending: STUDENT IN AN ORGANIZED HEALTH CARE EDUCATION/TRAINING PROGRAM | Admitting: PSYCHIATRY & NEUROLOGY
Payer: COMMERCIAL

## 2023-07-31 DIAGNOSIS — N30.00 ACUTE CYSTITIS WITHOUT HEMATURIA: ICD-10-CM

## 2023-07-31 DIAGNOSIS — R45.851 SUICIDAL IDEATIONS: Primary | ICD-10-CM

## 2023-07-31 LAB
ALBUMIN SERPL-MCNC: 3.6 G/DL (ref 3.5–5)
ALBUMIN/GLOB SERPL: 1 (ref 1.1–2.2)
ALP SERPL-CCNC: 52 U/L (ref 45–117)
ALT SERPL-CCNC: 24 U/L (ref 12–78)
ANION GAP SERPL CALC-SCNC: 6 MMOL/L (ref 5–15)
APAP SERPL-MCNC: <2 UG/ML (ref 10–30)
AST SERPL W P-5'-P-CCNC: 13 U/L (ref 15–37)
BASOPHILS # BLD: 0.1 K/UL (ref 0–0.1)
BASOPHILS NFR BLD: 1 % (ref 0–1)
BILIRUB SERPL-MCNC: 0.2 MG/DL (ref 0.2–1)
BUN SERPL-MCNC: 9 MG/DL (ref 6–20)
BUN/CREAT SERPL: 9 (ref 12–20)
CA-I BLD-MCNC: 8.9 MG/DL (ref 8.5–10.1)
CHLORIDE SERPL-SCNC: 106 MMOL/L (ref 97–108)
CO2 SERPL-SCNC: 28 MMOL/L (ref 21–32)
CREAT SERPL-MCNC: 0.98 MG/DL (ref 0.55–1.02)
DATE LAST DOSE: ABNORMAL
DATE LAST DOSE: ABNORMAL
DIFFERENTIAL METHOD BLD: NORMAL
DOSE AMOUNT: ABNORMAL UNITS
DOSE AMOUNT: ABNORMAL UNITS
EOSINOPHIL # BLD: 0.2 K/UL (ref 0–0.4)
EOSINOPHIL NFR BLD: 2 % (ref 0–7)
ERYTHROCYTE [DISTWIDTH] IN BLOOD BY AUTOMATED COUNT: 12.7 % (ref 11.5–14.5)
ETHANOL SERPL-MCNC: <10 MG/DL (ref 0–0.08)
FLUAV RNA SPEC QL NAA+PROBE: NOT DETECTED
FLUBV RNA SPEC QL NAA+PROBE: NOT DETECTED
GLOBULIN SER CALC-MCNC: 3.7 G/DL (ref 2–4)
GLUCOSE SERPL-MCNC: 141 MG/DL (ref 65–100)
HCT VFR BLD AUTO: 43.1 % (ref 35–47)
HGB BLD-MCNC: 14.3 G/DL (ref 11.5–16)
IMM GRANULOCYTES # BLD AUTO: 0 K/UL (ref 0–0.04)
IMM GRANULOCYTES NFR BLD AUTO: 0 % (ref 0–0.5)
LYMPHOCYTES # BLD: 2.9 K/UL (ref 0.8–3.5)
LYMPHOCYTES NFR BLD: 26 % (ref 12–49)
MCH RBC QN AUTO: 29.2 PG (ref 26–34)
MCHC RBC AUTO-ENTMCNC: 33.2 G/DL (ref 30–36.5)
MCV RBC AUTO: 88.1 FL (ref 80–99)
MONOCYTES # BLD: 0.5 K/UL (ref 0–1)
MONOCYTES NFR BLD: 5 % (ref 5–13)
NEUTS SEG # BLD: 7.2 K/UL (ref 1.8–8)
NEUTS SEG NFR BLD: 66 % (ref 32–75)
NRBC # BLD: 0 K/UL (ref 0–0.01)
NRBC BLD-RTO: 0 PER 100 WBC
PLATELET # BLD AUTO: 317 K/UL (ref 150–400)
PMV BLD AUTO: 10.9 FL (ref 8.9–12.9)
POTASSIUM SERPL-SCNC: 3.6 MMOL/L (ref 3.5–5.1)
PROT SERPL-MCNC: 7.3 G/DL (ref 6.4–8.2)
RBC # BLD AUTO: 4.89 M/UL (ref 3.8–5.2)
SALICYLATES SERPL-MCNC: <1.7 MG/DL (ref 2.8–20)
SARS-COV-2 RNA RESP QL NAA+PROBE: NOT DETECTED
SODIUM SERPL-SCNC: 140 MMOL/L (ref 136–145)
WBC # BLD AUTO: 11 K/UL (ref 3.6–11)

## 2023-07-31 PROCEDURE — 99285 EMERGENCY DEPT VISIT HI MDM: CPT

## 2023-07-31 PROCEDURE — 81025 URINE PREGNANCY TEST: CPT

## 2023-07-31 PROCEDURE — 80143 DRUG ASSAY ACETAMINOPHEN: CPT

## 2023-07-31 PROCEDURE — 83036 HEMOGLOBIN GLYCOSYLATED A1C: CPT

## 2023-07-31 PROCEDURE — 80061 LIPID PANEL: CPT

## 2023-07-31 PROCEDURE — 81001 URINALYSIS AUTO W/SCOPE: CPT

## 2023-07-31 PROCEDURE — 82077 ASSAY SPEC XCP UR&BREATH IA: CPT

## 2023-07-31 PROCEDURE — 80179 DRUG ASSAY SALICYLATE: CPT

## 2023-07-31 PROCEDURE — 80053 COMPREHEN METABOLIC PANEL: CPT

## 2023-07-31 PROCEDURE — 80307 DRUG TEST PRSMV CHEM ANLYZR: CPT

## 2023-07-31 PROCEDURE — 87636 SARSCOV2 & INF A&B AMP PRB: CPT

## 2023-07-31 PROCEDURE — 85025 COMPLETE CBC W/AUTO DIFF WBC: CPT

## 2023-07-31 PROCEDURE — 87086 URINE CULTURE/COLONY COUNT: CPT

## 2023-07-31 PROCEDURE — 36415 COLL VENOUS BLD VENIPUNCTURE: CPT

## 2023-07-31 ASSESSMENT — LIFESTYLE VARIABLES
HOW MANY STANDARD DRINKS CONTAINING ALCOHOL DO YOU HAVE ON A TYPICAL DAY: 1 OR 2
HOW OFTEN DO YOU HAVE A DRINK CONTAINING ALCOHOL: MONTHLY OR LESS

## 2023-08-01 PROBLEM — F32.9 MAJOR DEPRESSION, CHRONIC: Status: ACTIVE | Noted: 2023-08-01

## 2023-08-01 PROBLEM — F39 UNSPECIFIED MOOD (AFFECTIVE) DISORDER (HCC): Status: ACTIVE | Noted: 2023-08-01

## 2023-08-01 LAB
AMPHET UR QL SCN: NEGATIVE
APPEARANCE UR: CLEAR
BACTERIA SPEC CULT: NORMAL
BACTERIA URNS QL MICRO: ABNORMAL /HPF
BARBITURATES UR QL SCN: NEGATIVE
BENZODIAZ UR QL: NEGATIVE
BILIRUB UR QL: NEGATIVE
CANNABINOIDS UR QL SCN: POSITIVE
COCAINE UR QL SCN: NEGATIVE
COLONY COUNT, CNT: NORMAL
COLONY COUNT, CNT: NORMAL
COLOR UR: ABNORMAL
GLUCOSE UR STRIP.AUTO-MCNC: NEGATIVE MG/DL
HCG UR QL: NEGATIVE
HGB UR QL STRIP: NEGATIVE
KETONES UR QL STRIP.AUTO: NEGATIVE MG/DL
LEUKOCYTE ESTERASE UR QL STRIP.AUTO: ABNORMAL
Lab: ABNORMAL
Lab: NORMAL
MDMA URINE: NEGATIVE
METHADONE UR QL: NEGATIVE
NITRITE UR QL STRIP.AUTO: NEGATIVE
OPIATES UR QL: NEGATIVE
PCP UR QL: NEGATIVE
PH UR STRIP: 6 (ref 5–8)
PROT UR STRIP-MCNC: 30 MG/DL
RBC #/AREA URNS HPF: ABNORMAL /HPF (ref 0–3)
SP GR UR REFRACTOMETRY: >1.03 (ref 1–1.03)
URINE CULTURE IF INDICATED: ABNORMAL
UROBILINOGEN UR QL STRIP.AUTO: 0.2 EU/DL (ref 0.2–1)
WBC URNS QL MICRO: ABNORMAL /HPF (ref 0–5)

## 2023-08-01 PROCEDURE — 6370000000 HC RX 637 (ALT 250 FOR IP): Performed by: STUDENT IN AN ORGANIZED HEALTH CARE EDUCATION/TRAINING PROGRAM

## 2023-08-01 PROCEDURE — 1240000000 HC EMOTIONAL WELLNESS R&B

## 2023-08-01 PROCEDURE — 6370000000 HC RX 637 (ALT 250 FOR IP): Performed by: PHYSICIAN ASSISTANT

## 2023-08-01 RX ORDER — TRAZODONE HYDROCHLORIDE 50 MG/1
50 TABLET ORAL NIGHTLY PRN
Status: DISCONTINUED | OUTPATIENT
Start: 2023-08-01 | End: 2023-08-03 | Stop reason: HOSPADM

## 2023-08-01 RX ORDER — HALOPERIDOL 5 MG/ML
5 INJECTION INTRAMUSCULAR EVERY 4 HOURS PRN
Status: DISCONTINUED | OUTPATIENT
Start: 2023-08-01 | End: 2023-08-03 | Stop reason: HOSPADM

## 2023-08-01 RX ORDER — HYDROXYZINE 50 MG/1
50 TABLET, FILM COATED ORAL 3 TIMES DAILY PRN
Status: DISCONTINUED | OUTPATIENT
Start: 2023-08-01 | End: 2023-08-03 | Stop reason: HOSPADM

## 2023-08-01 RX ORDER — HALOPERIDOL 5 MG/1
5 TABLET ORAL EVERY 4 HOURS PRN
Status: DISCONTINUED | OUTPATIENT
Start: 2023-08-01 | End: 2023-08-03 | Stop reason: HOSPADM

## 2023-08-01 RX ORDER — SULFAMETHOXAZOLE AND TRIMETHOPRIM 800; 160 MG/1; MG/1
1 TABLET ORAL EVERY 12 HOURS SCHEDULED
Status: DISCONTINUED | OUTPATIENT
Start: 2023-08-01 | End: 2023-08-03 | Stop reason: HOSPADM

## 2023-08-01 RX ORDER — POLYETHYLENE GLYCOL 3350 17 G/17G
17 POWDER, FOR SOLUTION ORAL DAILY PRN
Status: DISCONTINUED | OUTPATIENT
Start: 2023-08-01 | End: 2023-08-03 | Stop reason: HOSPADM

## 2023-08-01 RX ORDER — MAGNESIUM HYDROXIDE/ALUMINUM HYDROXICE/SIMETHICONE 120; 1200; 1200 MG/30ML; MG/30ML; MG/30ML
30 SUSPENSION ORAL EVERY 6 HOURS PRN
Status: DISCONTINUED | OUTPATIENT
Start: 2023-08-01 | End: 2023-08-03 | Stop reason: HOSPADM

## 2023-08-01 RX ORDER — MEDROXYPROGESTERONE ACETATE 2.5 MG/1
10 TABLET ORAL DAILY
Status: DISCONTINUED | OUTPATIENT
Start: 2023-08-01 | End: 2023-08-01

## 2023-08-01 RX ORDER — ACETAMINOPHEN 325 MG/1
650 TABLET ORAL EVERY 4 HOURS PRN
Status: DISCONTINUED | OUTPATIENT
Start: 2023-08-01 | End: 2023-08-03 | Stop reason: HOSPADM

## 2023-08-01 RX ORDER — SULFAMETHOXAZOLE AND TRIMETHOPRIM 800; 160 MG/1; MG/1
1 TABLET ORAL 2 TIMES DAILY
Status: DISCONTINUED | OUTPATIENT
Start: 2023-08-01 | End: 2023-08-01

## 2023-08-01 RX ORDER — MEDROXYPROGESTERONE ACETATE 2.5 MG/1
10 TABLET ORAL DAILY
Status: DISCONTINUED | OUTPATIENT
Start: 2023-08-02 | End: 2023-08-03 | Stop reason: HOSPADM

## 2023-08-01 RX ORDER — DIPHENHYDRAMINE HYDROCHLORIDE 50 MG/ML
50 INJECTION INTRAMUSCULAR; INTRAVENOUS EVERY 4 HOURS PRN
Status: DISCONTINUED | OUTPATIENT
Start: 2023-08-01 | End: 2023-08-03 | Stop reason: HOSPADM

## 2023-08-01 RX ADMIN — SULFAMETHOXAZOLE AND TRIMETHOPRIM 1 TABLET: 800; 160 TABLET ORAL at 01:45

## 2023-08-01 RX ADMIN — SULFAMETHOXAZOLE AND TRIMETHOPRIM 1 TABLET: 800; 160 TABLET ORAL at 20:38

## 2023-08-01 ASSESSMENT — SLEEP AND FATIGUE QUESTIONNAIRES
DO YOU USE A SLEEP AID: YES
SLEEP PATTERN: DIFFICULTY FALLING ASLEEP;INSOMNIA
DO YOU HAVE DIFFICULTY SLEEPING: YES
AVERAGE NUMBER OF SLEEP HOURS: 4

## 2023-08-01 ASSESSMENT — ENCOUNTER SYMPTOMS
ALLERGIC/IMMUNOLOGIC NEGATIVE: 1
EYES NEGATIVE: 1
RESPIRATORY NEGATIVE: 1
GASTROINTESTINAL NEGATIVE: 1

## 2023-08-01 NOTE — ED TRIAGE NOTES
Pt presents to ED per [facility] for mental health complaint, suicidal ideations. When asked regarding plan for same, pt reports she has thought about shooting herself and does have access to firearms at home. Pt states she's been depressed recently due to not being able to get pregnant. Pt reports hx anxiety and depression but states she has not been on medication for same \"in a long time. \" Pt tearful upon triage, currently here voluntarily. Pt was made aware regarding mental health admission process and is currently agreeable to same.

## 2023-08-01 NOTE — ED PROVIDER NOTES
[address] [facility] [address]  EMERGENCY DEPARTMENT ENCOUNTER NOTE    Date: 7/31/2023  Patient Name: Radha Beth    History of Presenting Illness     Chief Complaint   Patient presents with    Mental Health Problem    Suicidal       History obtained from: Patient    HPI: Radha Beth, 24 y.o. female with past medical history as listed and reviewed below presents for suicidal ideations. The patient presents with complaints of suicidal ideations that she reports have gotten better now. She has been at home with her 3 dogs and has not seen her  for a few weeks. She started having some thoughts about hurting herself by shooting herself with a gun that she does have access to. She later said that the gun is at her house in a safe but that her  has access to that safe. She has been feeling depressed due to inability to conceive children despite her and her  trying. She has been on progesterone and has been having some mood fluctuations as well. She describes \"going from 0 to a 100 quickly\" and bursting into tears over the past two months. Does not take any other meds.     Medical History   I reviewed the medical, surgical, family, and social history, as well as allergies:    PCP: Elvia Oden MD    Past Medical History:  Past Medical History:   Diagnosis Date    Anxiety     Depression     Irregular menses      Past Surgical History:  Past Surgical History:   Procedure Laterality Date    CYST REMOVAL  01/31/2022    Pilonidal Cyst    HEENT      tonsilectomy    WISDOM TOOTH EXTRACTION       Current Outpatient Medications:  Current Outpatient Medications   Medication Instructions    clomiPHENE (CLOMID) 50 MG tablet 2 pills po day # 3 - 7 of LMP    diphenhydrAMINE (SOMINEX) 25 mg, Oral, EVERY 6 HOURS PRN    medroxyPROGESTERone (PROVERA) 10 mg, Oral, DAILY      Family History:  Family History   Problem Relation Age of Onset    Hypertension Paternal Grandmother     No Known

## 2023-08-01 NOTE — GROUP NOTE
Group Therapy Note    Date: 8/1/2023    Group Start Time: 1400  Group End Time: 1500  Group Topic:  Group     [address]        Group Therapy Note    Attendees: 7/7    Writer facilitated an inpatient process group. Writer had patients engage in mindfulness and expressive arts and held the space as patients processed feelings, discussed discharge plans, etc. Dean Perez had patients engage in a therapeutic expressive arts activity in which patients were asked to create a vision board of long term and short term goals. Writer encouraged patients to process. Writer concluded session with a grounding exercise and encouraging patients to provide input and feedback regarding the group. Patient's Goal:  To attend and participate in groups and activities daily. Notes:  Pt actively participated. Pt was able to engage in creating a vision board and discussed discharge plans. Status After Intervention:  Improved    Participation Level:  Active Listener and Interactive    Participation Quality: Appropriate, Attentive, Sharing, and Supportive      Speech:  normal      Thought Process/Content: Logical  Linear      Affective Functioning: Congruent      Mood: euthymic      Level of consciousness:  Alert, Oriented x4, and Attentive      Response to Learning: Able to verbalize current knowledge/experience, Able to verbalize/acknowledge new learning, Capable of insight, and Progressing to goal      Endings: None Reported    Modes of Intervention: Exploration and Activity      Discipline Responsible: /Counselor      Signature:  [employer]

## 2023-08-01 NOTE — BH NOTE
[facility]. MaryamFRENCH Askew aware and approval given.

## 2023-08-01 NOTE — CONSULTS
Hospitalist Consult Note             Chief Complaints:     Chief Complaint   Patient presents with    Mental Health Problem    Suicidal         Subjective:     Tomasz Jc is a 24 y.o. female followed by Ophelia Avalos MD and  has a past medical history of Anxiety, Depression, and Irregular menses. Presents to [facility] ED after it was noted that patient made suicidal ideations. She says she missed her  as he was away for several weeks and started to having thoughts about hurting herself and asked friend to stay with her. She denies any plan but it was noted a plan of shooting herself with gun. On questioning she completely denies all this and says it was taken out of context. Patient was evaluated and found to have UTI and was given bactrim and continued treatment for 3 day course. [facility]       Past Medical History:   Diagnosis Date    Anxiety     Depression     Irregular menses       Past Surgical History:   Procedure Laterality Date    CYST REMOVAL  01/31/2022    Pilonidal Cyst    HEENT      tonsilectomy    WISDOM TOOTH EXTRACTION       Family History   Problem Relation Age of Onset    Hypertension Paternal Grandmother     No Known Problems Mother     Hypertension Paternal Grandfather     Breast Cancer Paternal Grandmother     Hypertension Maternal Grandfather     Diabetes Maternal Grandfather     Hypertension Maternal Grandmother       Social History     Tobacco Use    Smoking status: Never    Smokeless tobacco: Never   Substance Use Topics    Alcohol use: Yes     Alcohol/week: 1.0 standard drink     Comment: 2 beers maybe a month       Prior to Admission medications    Medication Sig Start Date End Date Taking?  Authorizing Provider   diphenhydrAMINE (SOMINEX) 25 MG tablet Take 1 tablet by mouth every 6 hours as needed    Historical Provider, MD   medroxyPROGESTERone (PROVERA) 10 MG tablet Take 1 tablet by mouth daily

## 2023-08-01 NOTE — BH NOTE
[facility] ADMISSION NOTE    Pt. Arrived on the unit at approx: 0900, escorted by [employer] and  nurse via Via wheelchair. From our [facility])  ER. Pt. Awake. Admission Type:   Admission Type: Voluntary    Reason for admission:   Reason for Admission: Depression, fleeing suicidal thought mentioned to a friend. Addictive Behavior:   Addictive Behavior  In the Past 3 Months, Have You Felt or Has Someone Told You That You Have a Problem With  : None      Medical Problems:   Past Medical History:   Diagnosis Date    Anxiety     Depression     Irregular menses          Psych History:  Pt. Has depression, felt isolated,  was gone, did not have hardly anyone to talk to. Had a Fleeing thought of thinking it would be better if she was gone, then she said it went away. Patient is not, a smoker. If so, Nicotine patch ordered? N/A     Patient occasionally drinks Alcohol, maybe less than 4 a month if that most times. Patient does, use Recreational substances , Marijuana at least 4 times a week. Status EXAM:  Mental Status and Behavioral Exam  Normal: Yes  Level of Assistance: Independent/Self  Facial Expression: Sad, Worried  Affect: Appropriate  Level of Consciousness: Alert  Frequency of Checks: 4 times per hour, close  Mood:Normal: No  Mood: Depressed, Sad  Motor Activity:Normal: Yes  Eye Contact: Good  Observed Behavior: Cooperative, Friendly  Sexual Misconduct History: Current - no  Preception: [place] to person, [place] to time, [place] to place, [place] to situation  Attention:Normal: Yes  Thought Processes: Unremarkable  Thought Content:Normal: Yes  Depression Symptoms: Appetite change, Feelings of helplessness, Sleep disturbance  Anxiety Symptoms: Generalized  Rubi Symptoms: No problems reported or observed.   Hallucinations: None  Delusions: No  Memory:Normal: Yes  Insight and Judgment: Yes    Pt admitted with followings belongings:  Dental Appliances:

## 2023-08-01 NOTE — BSMART NOTE
Patient acepted to [facility] by Dr. Tracy Mccoy. Patient will go to bed 105-02. Informed Dr. Matt Vasquez.
Concerns not observed. Security/Off- has not been notified. The patient has demonstrated mental capacity to provide informed consent. The information is given by the patient. The Chief Complaint is SI. Previous Hospitalizations: None  The patient has not previously been in restraints. Current Psychiatrist and/or  is none. Lethality Assessment:    The potential for suicide noted by the following: defined plan, ideation, and means. The potential for homicide is not noted. The patient has not been a perpetrator of sexual or physical abuse. There are not pending charges. The patient is  felt to be at risk for self harm or harm to others. The attending nurse was advised to remove potentially harmful or dangerous items from the patient's room , to request a search of the patient's belongings, to remove patient clothing and place it out of immediate access to the patient, the patient is at risk for self harm, and the patient needs supervision. Section III - Psychosocial  The patient's overall mood and attitude is calm. Feelings of helplessness and hopelessness are not observed. Generalized anxiety is not observed. Panic is not observed. Phobias are not observed. Obsessive compulsive tendencies are not observed. Section IV - Mental Status Exam  The patient's appearance is disheveled. The patient's behavior shows no evidence of impairment. The patient is oriented to time, place, person and situation. The patient's speech shows no evidence of impairment. The patient's mood is depressed. The range of affect is flat. The patient's thought content demonstrates no evidence of impairment . The thought process shows no evidence of impairment. The patient's perception shows no evidence of impairment. The patient's memory is impaired. The patient's appetite shows no evidence of impairment . The patient's sleep has evidence of insomnia.  The patient shows some

## 2023-08-01 NOTE — ED NOTES
111  [facility] to make aware regarding need for observer per pt high risk for suicide.       Eduardo Mclean RN  07/31/23 0992
Another call placed to [facility] regarding consult, awaiting call back.      Stew Cadena RN  08/01/23 5505
Breakfast tray given.      Rohan Perez RN  08/01/23 7842
Left message for [facility] regarding consult, awaiting call back.       Rosemarie Javire RN  08/01/23 7905
Notified per [facility] staff that patient will be admitted to [facility] room 105 here at [facility].      Celso Hernández RN  08/01/23 8644
Patient belongings removed and placed in a belongings bag. Security called to secure belongings.      1 Purse  $1 in cash  Debit card  ID  Social Security card  Insurance card  Car keys  Cell phone   [address] [place], [place]  07/31/23 7544
Patients belongings given to significant other per patients request for him to take them home. States he will bring clothing etc. Back for her when she is discharged.      Justo Patel RN  08/01/23 1417
Report called to [facility].      Maia Dill RN  08/01/23 8296
Will agree to participate in appropriate outpatient care

## 2023-08-01 NOTE — GROUP NOTE
Group Therapy Note    Date: 8/1/2023    Group Start Time: 1000  Group End Time: 1030  Group Topic: Community Meeting    [facility]        Group Therapy Note    Attendees: 6       Patient's Goal:  none    Notes:       Status After Intervention:  Improved    Participation Level:  Active Listener    Participation Quality: Appropriate      Speech:  normal      Thought Process/Content: Logical      Affective Functioning: Congruent      Mood:  alert      Level of consciousness:  Alert      Response to Learning: Able to verbalize current knowledge/experience      Endings: None Reported    Modes of Intervention: Education      Discipline Responsible: [address]      Signature:  Alf Cooley

## 2023-08-02 LAB
CHOLEST SERPL-MCNC: 162 MG/DL
EST. AVERAGE GLUCOSE BLD GHB EST-MCNC: 105 MG/DL
HBA1C MFR BLD: 5.3 % (ref 4–5.6)
HDLC SERPL-MCNC: 35 MG/DL
HDLC SERPL: 4.6 (ref 0–5)
LDLC SERPL CALC-MCNC: 81.8 MG/DL (ref 0–100)
LIPID PANEL: ABNORMAL
TRIGL SERPL-MCNC: 226 MG/DL
VLDLC SERPL CALC-MCNC: 45.2 MG/DL

## 2023-08-02 PROCEDURE — 6370000000 HC RX 637 (ALT 250 FOR IP): Performed by: PHYSICIAN ASSISTANT

## 2023-08-02 PROCEDURE — 6370000000 HC RX 637 (ALT 250 FOR IP): Performed by: HOSPITALIST

## 2023-08-02 PROCEDURE — 1240000000 HC EMOTIONAL WELLNESS R&B

## 2023-08-02 RX ADMIN — MEDROXYPROGESTERONE ACETATE 10 MG: 2.5 TABLET ORAL at 08:40

## 2023-08-02 RX ADMIN — SULFAMETHOXAZOLE AND TRIMETHOPRIM 1 TABLET: 800; 160 TABLET ORAL at 21:12

## 2023-08-02 RX ADMIN — SULFAMETHOXAZOLE AND TRIMETHOPRIM 1 TABLET: 800; 160 TABLET ORAL at 08:41

## 2023-08-02 NOTE — GROUP NOTE
Group Therapy Note    Date: 8/2/2023    Group Start Time: 1430  Group End Time: 1520  Group Topic:  Group     [address]        Group Therapy Note    Attendees: 5/7    Writer facilitated an inpatient process/psych-ed group combo. Writer implemented expressive arts and various coping skills exercises. Writer held the space as patients processed and discussed discharge plans. Writer encouraged patients to process any feelings they may be having. Writer then facilitated a psych-ed discussion and provided a handout about CBT/Negative Thinking Errors. Writer encouraged patients to discuss which negative thinking errors they need to work on. Writer concluded session with a grounding exercise and encouraging patients to provide input and feedback regarding the group. Patient's Goal:  To attend and participate in groups and activities daily    Notes:  Pt actively participated. Pt was able to identify perfectionism as something she needs to work on. Status After Intervention:  Improved    Participation Level:  Active Listener and Interactive    Participation Quality: Appropriate, Attentive, Sharing, and Supportive      Speech:  normal      Thought Process/Content: Logical  Linear      Affective Functioning: Congruent      Mood: euthymic      Level of consciousness:  Alert, Oriented x4, and Attentive      Response to Learning: Able to verbalize current knowledge/experience, Able to verbalize/acknowledge new learning, and Progressing to goal      Endings: None Reported    Modes of Intervention: Education, Exploration, and Activity      Discipline Responsible: /Counselor      Signature:  [employer]

## 2023-08-02 NOTE — BH NOTE
B:   Patient alert and oriented x 4. Pt. States depression is 1/10. Pt. States Anxiety is 1/10. Pt. denies Hallucinations. Pt. denies Delusions. Pt. denies SI.  Pt. denies HI. Pt. cooperative with Assessment. Pt.'s behavior Cooperative and Pleasant. Expressed how she missed her  and trying to handle her anxiety. Pt noted with a UTI and currently on Bactrim DS.      I:    If patient is disoriented, reorient pt. Build trust with patient, by therapeutic listening and Groups. Encourage pt. To attend and Participate in Groups. Provide Medications as ordered and needed. Encourage pt. To be up for all meals and snacks, and consume all of each. Encourage pt. To interact with staff and peers in a positive manner. Encourage pt. To keep good hygiene. Q 15 minute safety checks. R:   Pt. did attend and Participate in Group. Pt. Is Compliant with Medications   Yes. Pt. is getting up for meals and snacks. Pt. Consumes 50% of Meals. Pt. is, interacting with Peers. Pt.'s hygiene is Good. Pt. does not, have any safety issues. P:   Pt. Will develop and continue to utilize positive Coping skills. Pt. Will continue to comply with Plan of Care toward Discharge. Pt. Educated on drinking more water, cutting back sodas and proper bathroom and bathing technique with the right body soap. Will continue to stay safe on the unit.     0600: Pt slept throughout the night with no distress noted while conducting rounds.

## 2023-08-02 NOTE — PLAN OF CARE
Problem: Discharge Planning  Goal: Discharge to home or other facility with appropriate resources  Recent Flowsheet Documentation  Taken 8/2/2023 1244 by Sean Hagen RN  Discharge to home or other facility with appropriate resources:   Identify barriers to discharge with patient and caregiver   Arrange for needed discharge resources and transportation as appropriate   Pt actively participating in groups on the unit. Pt open to outpatient therapy.

## 2023-08-02 NOTE — BH NOTE
TREATMENT TEAM COMPLETED     Attending meeting was as follows:  Patient, FRENCH Parekh, Issac Moses RN Unit Manager, Adela Oviedo RN and Chanel Bobo, Dorie Clinical Therspist       Meeting was conducted via [employer] consists of the following:     Pt. Cognitive status:  Alert and Oreinted x 4    Pt. Attending Groups: Yes    Pt. Participating in groups:  Yes    Pt. Homicidal / Suicidal: Denies    Pt. Behaviors:  Alert and Oriented x 4. Patient in hallway playing corn hole with peers. Laughing and joking with peers. Patient stated that she does not want to be on any medications and feels she will do much better just having a therapist to talk too. Patient has a very supportive . Support services to be set up prior to patient being discharged tomorrow. Patient feels she is ready to be discharged. Pt. Compliant with Medications:  Yes  Patient remains on Progesterone   And Bactrim for her UTI. New Medication orders:  No      Discharge Plan:  Home with outpatient services.

## 2023-08-02 NOTE — GROUP NOTE
Group Therapy Note    Date: 8/2/2023    Group Start Time: 1600  Group End Time: 4572  Group Topic: Nursing    [facility] Shaq Lo RN        Group Therapy Note: Self-Esteem Bingo    Attendees: 4/7       Patient's Goal:  decrease feelings of isolation    Notes:  Pt attended group interacting appropriately with staff and peers, showed insight during discussion of Self-Esteem Boosters, Self-Esteem Busters, etc.    Status After Intervention:  Improved    Participation Level:  Active Listener and Interactive    Participation Quality: Appropriate, Attentive, Sharing, and Supportive      Speech:  normal      Thought Process/Content: Logical      Affective Functioning: Congruent      Mood: elevated      Level of consciousness:  Alert, Oriented x4, and Attentive      Response to Learning: Able to verbalize current knowledge/experience, Able to verbalize/acknowledge new learning, Able to retain information, Capable of insight, Able to change behavior, and Progressing to goal      Endings: None Reported    Modes of Intervention: Education, Support, Socialization, Exploration, Clarifying, and Activity      Discipline Responsible: Registered Nurse      Signature:  Romi Emery RN

## 2023-08-02 NOTE — BH NOTE
PSYCHOSOCIAL ASSESSMENT  :Patient identifying info:   Suki Pena is a 24 y.o., female admitted 7/31/2023 10:37 PM     Presenting problem and precipitating factors: Pt was admitted voluntarily due to an increase in depression symptoms and thoughts of wanting to harm herself. Pt endorsing thoughts \"It would just be easier if I wasn't here. \" Pt has been experiencing stress lately and mood changes related to struggles with getting pregnant. Pt does have a hx of suicide attempt when she was younger. Mental status assessment: During assessment, pt presents as pleasant. Pt alert and oriented x4. Pt appropriately tearful when discussing struggles with getting pregnant. Pt open to outpatient therapy to address this. Pt denies current SI, HI, AVH. Strengths/Recreation/Coping Skills: Pt reports that she loves drawing, video games, and coloring. Collateral information: Pt signed release for . Current psychiatric /substance abuse providers and contact info: Pt not currently receiving services. Previous psychiatric/substance abuse providers and response to treatment: Pt reports hx of going to therapy. Pt was diagnosed with ADHD as a child. Hx of taking medication    Family history of mental illness or substance abuse: Family hx of depression    Substance abuse history:    Social History     Tobacco Use    Smoking status: Never    Smokeless tobacco: Never   Substance Use Topics    Alcohol use: Yes     Alcohol/week: 1.0 standard drink     Comment: 2 beers maybe a month       History of biomedical complications associated with substance abuse: No    Patient's current acceptance of treatment or motivation for change: Pt motivated for therapy. Pt was able to set goals [facility] self-respect. More self-love. \"    Family constellation: Pt raised by mother. Pt does not have children. Lives with . Is significant other involved?  Yes    Describe support system: Pt identifies her  and some friends as
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
## 2023-08-02 NOTE — BH NOTE
B: Pt is alert and oriented x4. Pt is cooperative with assessments. Pt reports \"missing my \". Pt states they are here because of her friend calling police when she made a comment while missing her . Pt identifies they're doing better since their admission. Pt is able to identify personal coping alternatives. No s/s of distress noted. No complaints of pain. I: Assess Pt mood. Document presence of depressive/anxiety symptoms. Assess for Audio/visual hallucinations. Establish trust.  Educate Pt on medications and disease processes. Monitor ADLs, food/fluid consumption and sleep. Encouarge group participation and socialization. Educate Pt on medications, coping alternatives, disease processes, and community resources. Safety checks. R: Pt mood is stable. Pt rates depression 0/10, identifies triggers as non. Pt rates anxiety at [facility]", identifies triggers as \"being here, alone\". Pt denies SI. Pt denies HI. Pt denies audio/visual hallucinations, s/s are not observed by staff. Pt is attending groups and is interacting appropriately with staff/peers. Pt is eating all meals, and is maintaining their personal hygiene. Pt reports sleeping on/off. Pt is compliant wiith medications. P: Encourage group participation and socialization.

## 2023-08-02 NOTE — GROUP NOTE
Group Therapy Note    Date: 8/1/2023    Group Start Time: 2000  Group End Time: 2045  Group Topic: Wrap-Up    SVR [address], CNA        Group Therapy Note    Attendees: 6       Patient's Goal:  Need A little Bit More Of Self Yancey    Notes:  Participated in Group    Status After Intervention:  Unchanged    Participation Level: Active Listener    Participation Quality: Appropriate      Speech:  normal      Thought Process/Content: Logical      Affective Functioning: Congruent      Mood: anxious and depressed      Level of consciousness:  Alert and Oriented x4      Response to Learning: Able to verbalize current knowledge/experience, Able to verbalize/acknowledge new learning, Able to retain information, Capable of insight, Able to change behavior, and Progressing to goal      Endings: None Reported    Modes of Intervention: Activity      Discipline Responsible: [place]      Signature:   Michelet Cronin CNA

## 2023-08-02 NOTE — PLAN OF CARE
Problem: Genitourinary - Adult  Goal: Absence of urinary retention  8/1/2023 2156 by Charito Barber RN  Outcome: Progressing  8/1/2023 1405 by Marge Lara LPN  Outcome: Progressing     Problem: Depression  Goal: Will be euthymic at discharge  Description: INTERVENTIONS:  1. Administer medication as ordered  2. Provide emotional support via 1:1 interaction with staff  3. Encourage involvement in milieu/groups/activities  4. Monitor for social isolation  8/1/2023 2156 by Charito Barber RN  Outcome: Progressing  8/1/2023 1405 by Marge Lara LPN  Outcome: Progressing     Problem: Drug Abuse/Detox  Goal: Will have no detox symptoms and will verbalize plan for changing drug-related behavior  Description: INTERVENTIONS:  1. Administer medication as ordered  2. Monitor physical status  3. Provide emotional support with 1:1 interaction with staff  4.  Encourage  recovery focused treatment   8/1/2023 2156 by Charito Barber RN  Outcome: Progressing  8/1/2023 1405 by Marge Lara LPN  Outcome: Progressing     Problem: Discharge Planning  Goal: Discharge to home or other facility with appropriate resources  8/1/2023 2156 by Charito Barber RN  Outcome: Progressing  8/1/2023 1405 by Marge Lara LPN  Outcome: Progressing     Problem: Safety - Adult  Goal: Free from fall injury  8/1/2023 2156 by Charito Barber RN  Outcome: Progressing  8/1/2023 1405 by Marge Lara LPN  Outcome: Progressing

## 2023-08-03 VITALS
HEART RATE: 56 BPM | WEIGHT: 220 LBS | OXYGEN SATURATION: 100 % | TEMPERATURE: 98 F | RESPIRATION RATE: 18 BRPM | DIASTOLIC BLOOD PRESSURE: 59 MMHG | BODY MASS INDEX: 37.56 KG/M2 | HEIGHT: 64 IN | SYSTOLIC BLOOD PRESSURE: 132 MMHG

## 2023-08-03 PROBLEM — F32.9 MAJOR DEPRESSION, CHRONIC: Status: RESOLVED | Noted: 2023-08-01 | Resolved: 2023-08-03

## 2023-08-03 PROCEDURE — 6370000000 HC RX 637 (ALT 250 FOR IP): Performed by: HOSPITALIST

## 2023-08-03 PROCEDURE — 6370000000 HC RX 637 (ALT 250 FOR IP): Performed by: PHYSICIAN ASSISTANT

## 2023-08-03 RX ADMIN — MEDROXYPROGESTERONE ACETATE 10 MG: 2.5 TABLET ORAL at 08:15

## 2023-08-03 RX ADMIN — SULFAMETHOXAZOLE AND TRIMETHOPRIM 1 TABLET: 800; 160 TABLET ORAL at 08:15

## 2023-08-03 NOTE — GROUP NOTE
Group Therapy Note    Date: 8/2/2023    Group Start Time: 2000  Group End Time: 2045  Group Topic: Wrap-Up    [facility] BSMART    Anola Fears        Group Therapy Note    Attendees: 6         Patient's Goal:  none    Notes:  happy    Status After Intervention:  Improved    Participation Level:  Active Listener    Participation Quality: Appropriate and Supportive      Speech:  normal      Thought Process/Content: Logical      Affective Functioning: Congruent      Mood:  happy      Level of consciousness:  Alert      Response to Learning: Able to verbalize current knowledge/experience      Endings: None Reported    Modes of Intervention: Socialization      Discipline Responsible: Behavorial Health Tech      Signature:  Rudi Frankss
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
## 2023-08-03 NOTE — PROGRESS NOTES
B: Pt is out and about on unit this evening. Interacts well with staff and peers. Denies suicidal and homicidal thoughts, says she has only mild depression and anxiety that is better. Eating and sleeping well, attending and participating in groups. Safe on unit. I: Maintain a safe environment for pt, safety cks q 15 mins and prn. Give meds if ordered, and encourage groups. R: Pt is pleasant and cooperative with assessment, says she hopes to go home soon. Encouraged to work on coping skills, safe on unit. P: Continue to monitor, encourage groups.
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
## 2023-08-03 NOTE — GROUP NOTE
Group Therapy Note    Date: 8/3/2023    Group Start Time: 2912  Group End Time: 3084  Group Topic: Psychoeducation     [address]        Group Therapy Note    Attendees: 7/7    Writer facilitated a psych-ed group using a handout about the concept of Forgiveness. Therapeutic purpose of the group was for patients to get an understanding of what forgiveness is and what it is not in their therapeutic journey. Writer encouraged patients to share and ask questions. Writer concluded with grounding exercise. Patient's Goal:  To attend and participate in groups and activities daily. Notes:  Pt actively participated. Pt was able to discuss her feelings and some challenges she faces with forgiveness. Status After Intervention:  Improved    Participation Level:  Active Listener and Interactive    Participation Quality: Appropriate, Attentive, Sharing, and Supportive      Speech:  normal      Thought Process/Content: Logical  Linear      Affective Functioning: Congruent      Mood: euthymic      Level of consciousness:  Alert, Oriented x4, and Attentive      Response to Learning: Able to verbalize current knowledge/experience, Able to verbalize/acknowledge new learning, Able to retain information, Capable of insight, and Progressing to goal      Endings: None Reported    Modes of Intervention: Education      Discipline Responsible: /Counselor      Signature:  [employer]

## 2023-08-03 NOTE — BH NOTE
B: Patient is alert and oriented x 4. Patient has been very pleasant and cooperative. Patient will have be a walk wanda with [facility] on Tuesday 8/8/23. Patient feels she would really benefit from talking to a therapist. Patient does not want to take any medications as she and her  are trying to have a baby. Patient is eating and sleeping well at night. Has a great support system with her . Patient interacts well with staff and peers. Denies SI/HI ideations. I. Discharge home today with outside services by [facility]. Continue every 15 minute safety checks until discharge. R: Patient has met the goals set for her and is ready for discharge. Patient will be discharged on Progesterone as she was taking pre -hospitalization and continue on Bactrim DS for her UTI. Talked to patient about ways to prevent UTI. Pamphlet given to patient. Patient verbalized understanding of all teaching. P: Plan for discharge home this pm with outside therapy services from [facility].  will pick patient up after he gets off from work today.

## 2023-08-03 NOTE — GROUP NOTE
Group Therapy Note    Date: 8/3/2023    Group Start Time: 3241  Group End Time: 8938  Group Topic: Process Group - Inpatient     [address]        Group Therapy Note    Attendees: 7/7    Writer facilitated an inpatient process group. Writer encouraged patients to discuss feelings they may be having, discuss discharge plans, etc. Writer held the space as patients processed. Writer implemented mindfulness and expressive arts exercises. Writer concluded with a grounding exercise and encouraging patients to provide input and feedback regarding the group. Patient's Goal:  To attend and participate in groups and activities daily    Notes:  Pt actively participated. Pt was able to discuss her feelings about discharge and what she hopes to do in therapy. Status After Intervention:  Improved    Participation Level:  Active Listener and Interactive    Participation Quality: Appropriate, Attentive, Sharing, and Supportive      Speech:  normal      Thought Process/Content: Logical  Linear      Affective Functioning: Congruent      Mood: euthymic      Level of consciousness:  Alert, Oriented x4, and Attentive      Response to Learning: Able to verbalize current knowledge/experience, Able to verbalize/acknowledge new learning, Able to retain information, Capable of insight, and Progressing to goal      Endings: None Reported    Modes of Intervention: Exploration and Activity      Discipline Responsible: /Counselor      Signature:  [employer]

## 2023-08-03 NOTE — BH NOTE
Pt. Discharge paperwork explained to pt. Copy given to pt. .   Pt. Verbalized understanding. Pt. States her  is bringing her clothes to wear home, as she has nothing here.   Awaiting pt's  arrival.

## 2023-08-03 NOTE — BH NOTE
DISCHARGE SUMMARY    NAME:Girish Bray  : 2000  MRN: [id number]    The patient Paige Snow exhibits the ability to control behavior in a less restrictive environment. Patient's level of functioning is improving. No assaultive/destructive behavior has been observed for the past 24 hours. No suicidal/homicidal threat or behavior has been observed for the past 24 hours. There is no evidence of serious medication side effects. Patient has not been in physical or protective restraints for at least the past 24 hours. If weapons involved, how are they secured?  has weapons locked in safe    Is patient aware of and in agreement with discharge plan? Yes    Arrangements for medication:  Prescriptions On file    Copy of discharge instructions to provider?:  Yes    Arrangements for transportation home:  Pt will be picked up and taken home by . Keep all follow up appointments as scheduled, continue to take prescribed medications per physician instructions.   Mental health crisis number:  228 or your local mental health crisis line number at [facility] WARM LINE      2-737-740-MHAV (3051)      M-F: 9am to 9pm      Sat & Sun: 5pm - 9pm  National suicide prevention lines:                             9-536-HMTGICR (2-375-109-404-836-1794)       3-251-904-TALK ([phone])    Crisis Text Line:  Text HOME to 204009

## 2023-08-03 NOTE — GROUP NOTE
Group Therapy Note    Date: 8/3/2023    Group Start Time: 0900  Group End Time: 8439  Group Topic: Community Meeting    [facility]        Group Therapy Note 7       Patient's Goal:  To go home           Notes:  Not enough  sleep, Noise  level       Status After Intervention:  Improved    Participation Level:  Active Listener and Interactive    Participation Quality: Appropriate      Speech:  normal      Thought Process/Content: Linear      Affective Functioning: Congruent      Mood: anxious      Level of consciousness:  Alert      Response to Learning: Able to verbalize current knowledge/experience      Endings: None Reported    Modes of Intervention: Support      Discipline Responsible: [address] [facility]      Signature:  [employer]

## 2023-08-03 NOTE — BH NOTE
Pt. Belongings given back to pt., verified all there, signed for. Discharge instructions explained to pt. Including, Follow up appt. With [facility] . Medications called into No medications ordered. Pt. Denies SI/HI at time of discharge. Pt. is not a Smoker. Smoking cessation education was notprovided. Pt. is not a drinker. Alcohol Education was not Provided. Discharge Paperwork and H & P, faxed to d19, With success sheet. Pt. was not discharged on 2 or more Antipsychotics. Pt. Displayed no safety concerns at discharge. Pt. Escorted to front by staff for transportation by , to home.

## 2023-11-27 ENCOUNTER — OFFICE VISIT (OUTPATIENT)
Age: 23
End: 2023-11-27
Payer: COMMERCIAL

## 2023-11-27 VITALS
SYSTOLIC BLOOD PRESSURE: 157 MMHG | WEIGHT: 239.5 LBS | HEIGHT: 65 IN | DIASTOLIC BLOOD PRESSURE: 95 MMHG | BODY MASS INDEX: 39.9 KG/M2

## 2023-11-27 DIAGNOSIS — R10.2 PELVIC PAIN IN FEMALE: Primary | ICD-10-CM

## 2023-11-27 DIAGNOSIS — N83.9 TUBAL DISEASE: ICD-10-CM

## 2023-11-27 PROCEDURE — 99213 OFFICE O/P EST LOW 20 MIN: CPT | Performed by: OBSTETRICS & GYNECOLOGY

## 2023-11-27 NOTE — PROGRESS NOTES
Garfield Oh is a 24 y.o. female who presents today for the following:  Chief Complaint   Patient presents with    Follow-up     Pt presents to discuss medication and TTC //Fredis         Allergies   Allergen Reactions    Coconut Fatty Acids Rash and Swelling       Current Outpatient Medications   Medication Sig Dispense Refill    diphenhydrAMINE (SOMINEX) 25 MG tablet Take 1 tablet by mouth every 6 hours as needed      clomiPHENE (CLOMID) 50 MG tablet 2 pills po day # 3 - 7 of LMP 30 tablet 3     No current facility-administered medications for this visit. Past Medical History:   Diagnosis Date    Anxiety     Depression     Irregular menses        Past Surgical History:   Procedure Laterality Date    CYST REMOVAL  01/31/2022    Pilonidal Cyst    HEENT      tonsilectomy    WISDOM TOOTH EXTRACTION         Family History   Problem Relation Age of Onset    Hypertension Paternal Grandmother     No Known Problems Mother     Hypertension Paternal Grandfather     Breast Cancer Paternal Grandmother     Hypertension Maternal Grandfather     Diabetes Maternal Grandfather     Hypertension Maternal Grandmother        Social History     Socioeconomic History    Marital status:      Spouse name: Rosey Mike    Number of children: Not on file    Years of education: Not on file    Highest education level: Not on file   Occupational History    Not on file   Tobacco Use    Smoking status: Never    Smokeless tobacco: Never   Vaping Use    Vaping Use: Former    Substances: Nicotine   Substance and Sexual Activity    Alcohol use:  Yes     Alcohol/week: 1.0 standard drink of alcohol     Comment: 2 beers maybe a month    Drug use: Yes     Frequency: 2.0 times per week     Types: Marijuana Kreg Giuseppeen)    Sexual activity: Yes     Partners: Male     Birth control/protection: Condom   Other Topics Concern    Not on file   Social History Narrative    Not on file     Social Determinants of Health     Financial Resource Strain: Not on file

## 2023-11-30 ENCOUNTER — TELEPHONE (OUTPATIENT)
Age: 23
End: 2023-11-30

## 2023-11-30 NOTE — TELEPHONE ENCOUNTER
Left a voicemail on 11/30 at 1:36 PM. Needs to speak with Dr. Cristofer Mast urgently. I returned the patients call on 11/30 at 2:22 PM. Patient says they have been trying to conceive for 3 years and have been unable to. Patient thinks that part of that reason is because he prescribed Birth Control to her instead of giving her something that can actually help.

## 2023-11-30 NOTE — TELEPHONE ENCOUNTER
Returned the patient's call and advised her Provera 10 mg is not birth control but is for ovarian dysfunction. Questioned if she has been scheduled for the HSG yet and she states she has not. Number for scheduling given.

## 2023-12-27 ENCOUNTER — HOSPITAL ENCOUNTER (OUTPATIENT)
Facility: HOSPITAL | Age: 23
Discharge: HOME | End: 2023-12-30
Attending: OBSTETRICS & GYNECOLOGY
Payer: COMMERCIAL

## 2023-12-27 DIAGNOSIS — R10.2 PELVIC PAIN IN FEMALE: ICD-10-CM

## 2023-12-27 DIAGNOSIS — N83.9 TUBAL DISEASE: ICD-10-CM

## 2023-12-27 PROCEDURE — 6360000004 HC RX CONTRAST MEDICATION: Performed by: OBSTETRICS & GYNECOLOGY

## 2023-12-27 PROCEDURE — 58340 CATHETER FOR HYSTEROGRAPHY: CPT

## 2023-12-27 RX ADMIN — IOPAMIDOL 100 ML: 510 INJECTION, SOLUTION INTRAVASCULAR at 13:31

## 2024-01-09 NOTE — PROGRESS NOTES
OFFICE VISIT NOTE    Grupo Jensen is a 24 y.o. female who presents to the office today for:    Chief Complaint   Patient presents with    Follow-up     wound check       Garfield Smyth comes in today for follow-up status post excision of pilonidal cyst on January 31, 2022. She has been rinsing the wound out with showerhead on warm pulse lavage and cover with a dry dressing. She has no complaints today. She denies any pain or drainage from the site      Past Medical History:   Diagnosis Date    Anxiety     Irregular menses        Past Surgical History:   Procedure Laterality Date    HX CYST REMOVAL  01/31/2022    Pilonidal Cyst    HX HEENT      tonsilectomy    HX WISDOM TEETH EXTRACTION         Family History   Problem Relation Age of Onset    Hypertension Maternal Grandmother     Hypertension Maternal Grandfather     Diabetes Maternal Grandfather     Breast Cancer Paternal Grandmother     Hypertension Paternal Grandmother     Hypertension Paternal Grandfather     No Known Problems Mother        Social History     Socioeconomic History    Marital status:      Spouse name: Not on file    Number of children: Not on file    Years of education: Not on file    Highest education level: Not on file   Occupational History    Not on file   Tobacco Use    Smoking status: Never Smoker    Smokeless tobacco: Never Used   Vaping Use    Vaping Use: Former   Substance and Sexual Activity    Alcohol use:  Yes     Alcohol/week: 1.0 standard drink     Types: 1 Glasses of wine per week     Comment: Socially    Drug use: Never    Sexual activity: Yes     Partners: Male     Birth control/protection: Condom   Other Topics Concern    Not on file   Social History Narrative    Not on file     Social Determinants of Health     Financial Resource Strain:     Difficulty of Paying Living Expenses: Not on file   Food Insecurity:     Worried About Running Out of Food in the Last Year: Not on file    Hi of Food in the Last Year: Not on file   Transportation Needs:     Lack of Transportation (Medical): Not on file    Lack of Transportation (Non-Medical): Not on file   Physical Activity:     Days of Exercise per Week: Not on file    Minutes of Exercise per Session: Not on file   Stress:     Feeling of Stress : Not on file   Social Connections:     Frequency of Communication with Friends and Family: Not on file    Frequency of Social Gatherings with Friends and Family: Not on file    Attends [employer] Services: Not on file    Active Member of [address] [place] [address] [employer] or Organizations: Not on file    Attends Club or Organization Meetings: Not on file    Marital Status: Not on file   Intimate Partner Violence:     Fear of Current or Ex-Partner: Not on file    Emotionally Abused: Not on file    Physically Abused: Not on file    Sexually Abused: Not on file   Housing Stability:     Unable to Pay for Housing in the Last Year: Not on file    Number of [place] in the Last Year: Not on file    Unstable Housing in the Last Year: Not on file       Allergies   Allergen Reactions    Coconut Rash and Swelling       Current Outpatient Medications   Medication Sig    diphenhydrAMINE (Benadryl Allergy) 25 mg tablet Take 25 mg by mouth every six (6) hours as needed. No current facility-administered medications for this visit. Review of Systems   All other systems reviewed and are negative. /60 (BP 1 Location: Right arm, BP Patient Position: Sitting)   Pulse 82   Temp 97.6 °F (36.4 °C) (Temporal)   Resp 18   Ht 5' 5\" (1.651 m)   Wt 222 lb 3.2 oz (100.8 kg)   SpO2 97%   BMI 36.98 kg/m²     Physical Exam  Genitourinary:     Comments: Emanation her wound above the gluteal cleft is healing nicely. There is one small area which remains open with healthy granulation tissue flush with the skin.   I do not appreciate any fistulous tracts        Problem List Items Addressed This Visit        Integumentary    Pilonidal cyst - Primary          Assessment and Plan:    Doing well  Follow-up 3 weeks  Continue local wound care              Matt Grant MD 08-Jan-2024

## 2024-02-27 ENCOUNTER — OFFICE VISIT (OUTPATIENT)
Age: 24
End: 2024-02-27
Payer: COMMERCIAL

## 2024-02-27 VITALS
WEIGHT: 238 LBS | HEIGHT: 65 IN | BODY MASS INDEX: 39.65 KG/M2 | SYSTOLIC BLOOD PRESSURE: 116 MMHG | DIASTOLIC BLOOD PRESSURE: 68 MMHG

## 2024-02-27 PROCEDURE — 99213 OFFICE O/P EST LOW 20 MIN: CPT | Performed by: OBSTETRICS & GYNECOLOGY

## 2024-02-27 NOTE — PROGRESS NOTES
Girish Bray is a 23 y.o. female who presents today for the following:  Chief Complaint   Patient presents with    Follow-up     Pt presents for follow up and HSG results. Pt states she is still not having cycles but does have cramping once a month, still TTC//OTFeeley         Allergies   Allergen Reactions    Coconut Fatty Acids Rash and Swelling       Current Outpatient Medications   Medication Sig Dispense Refill    diphenhydrAMINE (SOMINEX) 25 MG tablet Take 1 tablet by mouth every 6 hours as needed      clomiPHENE (CLOMID) 50 MG tablet 2 pills po day # 3 - 7 of LMP 30 tablet 3     No current facility-administered medications for this visit.       Past Medical History:   Diagnosis Date    Anxiety     Depression     Irregular menses        Past Surgical History:   Procedure Laterality Date    CYST REMOVAL  01/31/2022    Pilonidal Cyst    HEENT      tonsilectomy    WISDOM TOOTH EXTRACTION      XR HYSTEROSALPINGOGRAM INJECTION  12/27/2023    XR HYSTEROSALPINGOGRAM INJECTION 12/27/2023 [facility]       Family History   Problem Relation Age of Onset    Hypertension Paternal Grandmother     No Known Problems Mother     Hypertension Paternal Grandfather     Breast Cancer Paternal Grandmother     Hypertension Maternal Grandfather     Diabetes Maternal Grandfather     Hypertension Maternal Grandmother        Social History     Socioeconomic History    Marital status:      Spouse name: Marciano    Number of children: Not on file    Years of education: Not on file    Highest education level: Not on file   Occupational History    Not on file   Tobacco Use    Smoking status: Never    Smokeless tobacco: Never   Vaping Use    Vaping Use: Former    Substances: Nicotine   Substance and Sexual Activity    Alcohol use: Yes     Alcohol/week: 1.0 standard drink of alcohol     Comment: 2 beers maybe a month    Drug use: Yes     Frequency: 2.0 times per week     Types: Marijuana (Weed)    Sexual activity: Yes     Partners: Male

## 2025-03-20 ENCOUNTER — HOSPITAL ENCOUNTER (EMERGENCY)
Facility: HOSPITAL | Age: 25
Discharge: HOME | End: 2025-03-20
Payer: COMMERCIAL

## 2025-03-20 ENCOUNTER — APPOINTMENT (OUTPATIENT)
Facility: HOSPITAL | Age: 25
End: 2025-03-20
Payer: COMMERCIAL

## 2025-03-20 VITALS
WEIGHT: 220 LBS | HEART RATE: 86 BPM | RESPIRATION RATE: 18 BRPM | BODY MASS INDEX: 37.56 KG/M2 | DIASTOLIC BLOOD PRESSURE: 76 MMHG | OXYGEN SATURATION: 98 % | HEIGHT: 64 IN | TEMPERATURE: 97.9 F | SYSTOLIC BLOOD PRESSURE: 127 MMHG

## 2025-03-20 DIAGNOSIS — R05.1 ACUTE COUGH: Primary | ICD-10-CM

## 2025-03-20 DIAGNOSIS — N30.00 ACUTE CYSTITIS WITHOUT HEMATURIA: ICD-10-CM

## 2025-03-20 LAB
ALBUMIN SERPL-MCNC: 3.9 G/DL (ref 3.5–5)
ALBUMIN/GLOB SERPL: 1 (ref 1.1–2.2)
ALP SERPL-CCNC: 68 U/L (ref 45–117)
ALT SERPL-CCNC: 45 U/L (ref 12–78)
ANION GAP SERPL CALC-SCNC: 7 MMOL/L (ref 2–12)
APPEARANCE UR: ABNORMAL
AST SERPL W P-5'-P-CCNC: 20 U/L (ref 15–37)
BACTERIA URNS QL MICRO: ABNORMAL /HPF
BASOPHILS # BLD: 0.08 K/UL (ref 0–0.1)
BASOPHILS NFR BLD: 0.8 % (ref 0–1)
BILIRUB SERPL-MCNC: 0.6 MG/DL (ref 0.2–1)
BILIRUB UR QL: NEGATIVE
BUN SERPL-MCNC: 17 MG/DL (ref 6–20)
BUN/CREAT SERPL: 21 (ref 12–20)
CA-I BLD-MCNC: 9.4 MG/DL (ref 8.5–10.1)
CHLORIDE SERPL-SCNC: 109 MMOL/L (ref 97–108)
CO2 SERPL-SCNC: 25 MMOL/L (ref 21–32)
COLOR UR: ABNORMAL
CREAT SERPL-MCNC: 0.82 MG/DL (ref 0.55–1.02)
DIFFERENTIAL METHOD BLD: ABNORMAL
EOSINOPHIL # BLD: 0.21 K/UL (ref 0–0.4)
EOSINOPHIL NFR BLD: 2.2 % (ref 0–7)
EPITH CASTS URNS QL MICRO: ABNORMAL /LPF
ERYTHROCYTE [DISTWIDTH] IN BLOOD BY AUTOMATED COUNT: 12.9 % (ref 11.5–14.5)
FLUAV RNA SPEC QL NAA+PROBE: NOT DETECTED
FLUBV RNA SPEC QL NAA+PROBE: NOT DETECTED
GLOBULIN SER CALC-MCNC: 4 G/DL (ref 2–4)
GLUCOSE SERPL-MCNC: 83 MG/DL (ref 65–100)
GLUCOSE UR STRIP.AUTO-MCNC: NEGATIVE MG/DL
HCG SERPL QL: NEGATIVE
HCG UR QL: NEGATIVE
HCT VFR BLD AUTO: 46 % (ref 35–47)
HGB BLD-MCNC: 15.5 G/DL (ref 11.5–16)
HGB UR QL STRIP: NEGATIVE
IMM GRANULOCYTES # BLD AUTO: 0.03 K/UL (ref 0–0.04)
IMM GRANULOCYTES NFR BLD AUTO: 0.3 % (ref 0–0.5)
KETONES UR QL STRIP.AUTO: 80 MG/DL
LEUKOCYTE ESTERASE UR QL STRIP.AUTO: ABNORMAL
LIPASE SERPL-CCNC: 33 U/L (ref 13–75)
LYMPHOCYTES # BLD: 2.08 K/UL (ref 0.8–3.5)
LYMPHOCYTES NFR BLD: 21.5 % (ref 12–49)
MAGNESIUM SERPL-MCNC: 2.1 MG/DL (ref 1.6–2.4)
MCH RBC QN AUTO: 29.4 PG (ref 26–34)
MCHC RBC AUTO-ENTMCNC: 33.7 G/DL (ref 30–36.5)
MCV RBC AUTO: 87.3 FL (ref 80–99)
MONOCYTES # BLD: 0.82 K/UL (ref 0–1)
MONOCYTES NFR BLD: 8.5 % (ref 5–13)
MUCOUS THREADS URNS QL MICRO: ABNORMAL /LPF
NEUTS SEG # BLD: 6.47 K/UL (ref 1.8–8)
NEUTS SEG NFR BLD: 66.7 % (ref 32–75)
NITRITE UR QL STRIP.AUTO: NEGATIVE
NRBC # BLD: 0 K/UL (ref 0–0.01)
NRBC BLD-RTO: 0 PER 100 WBC
PH UR STRIP: 5 (ref 5–8)
PLATELET # BLD AUTO: 339 K/UL (ref 150–400)
PMV BLD AUTO: 11.4 FL (ref 8.9–12.9)
POTASSIUM SERPL-SCNC: 3.9 MMOL/L (ref 3.5–5.1)
PROT SERPL-MCNC: 7.9 G/DL (ref 6.4–8.2)
PROT UR STRIP-MCNC: 30 MG/DL
RBC # BLD AUTO: 5.27 M/UL (ref 3.8–5.2)
RBC #/AREA URNS HPF: ABNORMAL /HPF (ref 0–5)
SARS-COV-2 RNA RESP QL NAA+PROBE: NOT DETECTED
SODIUM SERPL-SCNC: 141 MMOL/L (ref 136–145)
SP GR UR REFRACTOMETRY: >1.03 (ref 1–1.03)
UROBILINOGEN UR QL STRIP.AUTO: 0.1 EU/DL (ref 0.1–1)
WBC # BLD AUTO: 9.7 K/UL (ref 3.6–11)
WBC URNS QL MICRO: ABNORMAL /HPF (ref 0–4)

## 2025-03-20 PROCEDURE — 81001 URINALYSIS AUTO W/SCOPE: CPT

## 2025-03-20 PROCEDURE — 85025 COMPLETE CBC W/AUTO DIFF WBC: CPT

## 2025-03-20 PROCEDURE — 36415 COLL VENOUS BLD VENIPUNCTURE: CPT

## 2025-03-20 PROCEDURE — 93005 ELECTROCARDIOGRAM TRACING: CPT

## 2025-03-20 PROCEDURE — 71046 X-RAY EXAM CHEST 2 VIEWS: CPT

## 2025-03-20 PROCEDURE — 94760 N-INVAS EAR/PLS OXIMETRY 1: CPT

## 2025-03-20 PROCEDURE — 96374 THER/PROPH/DIAG INJ IV PUSH: CPT

## 2025-03-20 PROCEDURE — 83735 ASSAY OF MAGNESIUM: CPT

## 2025-03-20 PROCEDURE — 87636 SARSCOV2 & INF A&B AMP PRB: CPT

## 2025-03-20 PROCEDURE — 2580000003 HC RX 258

## 2025-03-20 PROCEDURE — 83690 ASSAY OF LIPASE: CPT

## 2025-03-20 PROCEDURE — 84703 CHORIONIC GONADOTROPIN ASSAY: CPT

## 2025-03-20 PROCEDURE — 80053 COMPREHEN METABOLIC PANEL: CPT

## 2025-03-20 PROCEDURE — 81025 URINE PREGNANCY TEST: CPT

## 2025-03-20 PROCEDURE — 99284 EMERGENCY DEPT VISIT MOD MDM: CPT

## 2025-03-20 PROCEDURE — 6360000002 HC RX W HCPCS

## 2025-03-20 RX ORDER — ONDANSETRON 4 MG/1
4 TABLET, ORALLY DISINTEGRATING ORAL 3 TIMES DAILY PRN
Qty: 21 TABLET | Refills: 0 | Status: SHIPPED | OUTPATIENT
Start: 2025-03-20

## 2025-03-20 RX ORDER — IBUPROFEN 600 MG/1
600 TABLET, FILM COATED ORAL 3 TIMES DAILY PRN
Qty: 30 TABLET | Refills: 0 | Status: SHIPPED | OUTPATIENT
Start: 2025-03-20

## 2025-03-20 RX ORDER — ACETAMINOPHEN 500 MG
1000 TABLET ORAL 3 TIMES DAILY PRN
Qty: 30 TABLET | Refills: 0 | Status: SHIPPED | OUTPATIENT
Start: 2025-03-20 | End: 2025-03-30

## 2025-03-20 RX ORDER — ONDANSETRON 2 MG/ML
4 INJECTION INTRAMUSCULAR; INTRAVENOUS ONCE
Status: COMPLETED | OUTPATIENT
Start: 2025-03-20 | End: 2025-03-20

## 2025-03-20 RX ORDER — ONDANSETRON 4 MG/1
4 TABLET, ORALLY DISINTEGRATING ORAL ONCE
Status: DISCONTINUED | OUTPATIENT
Start: 2025-03-20 | End: 2025-03-20

## 2025-03-20 RX ORDER — DEXTROMETHORPHAN HYDROBROMIDE AND PROMETHAZINE HYDROCHLORIDE 15; 6.25 MG/5ML; MG/5ML
5 SYRUP ORAL 4 TIMES DAILY PRN
Qty: 118 ML | Refills: 0 | Status: SHIPPED | OUTPATIENT
Start: 2025-03-20

## 2025-03-20 RX ORDER — 0.9 % SODIUM CHLORIDE 0.9 %
1000 INTRAVENOUS SOLUTION INTRAVENOUS ONCE
Status: COMPLETED | OUTPATIENT
Start: 2025-03-20 | End: 2025-03-20

## 2025-03-20 RX ADMIN — ONDANSETRON 4 MG: 2 INJECTION, SOLUTION INTRAMUSCULAR; INTRAVENOUS at 18:03

## 2025-03-20 RX ADMIN — SODIUM CHLORIDE 1000 ML: 0.9 INJECTION, SOLUTION INTRAVENOUS at 18:03

## 2025-03-20 ASSESSMENT — PAIN SCALES - GENERAL
PAINLEVEL_OUTOF10: 3
PAINLEVEL_OUTOF10: 4

## 2025-03-20 ASSESSMENT — LIFESTYLE VARIABLES
HOW OFTEN DO YOU HAVE A DRINK CONTAINING ALCOHOL: PATIENT DECLINED
HOW MANY STANDARD DRINKS CONTAINING ALCOHOL DO YOU HAVE ON A TYPICAL DAY: PATIENT DECLINED

## 2025-03-20 ASSESSMENT — PAIN DESCRIPTION - LOCATION: LOCATION: THROAT

## 2025-03-20 ASSESSMENT — PAIN - FUNCTIONAL ASSESSMENT: PAIN_FUNCTIONAL_ASSESSMENT: 0-10

## 2025-03-20 NOTE — ED PROVIDER NOTES
tablets by mouth 3 times daily as needed for Pain     amoxicillin-clavulanate 875-125 MG per tablet  Commonly known as: AUGMENTIN  Take 1 tablet by mouth 2 times daily for 10 days     ibuprofen 600 MG tablet  Commonly known as: ADVIL;MOTRIN  Take 1 tablet by mouth 3 times daily as needed for Pain     ondansetron 4 MG disintegrating tablet  Commonly known as: ZOFRAN-ODT  Take 1 tablet by mouth 3 times daily as needed for Nausea or Vomiting     promethazine-dextromethorphan 6.25-15 MG/5ML syrup  Commonly known as: PROMETHAZINE-DM  Take 5 mLs by mouth 4 times daily as needed for Cough            ASK your doctor about these medications      clomiPHENE 50 MG tablet  Commonly known as: CLOMID  2 pills po day # 3 - 7 of LMP     diphenhydrAMINE 25 MG tablet  Commonly known as: SOMINEX               Where to Get Your Medications        These medications were sent to [facility]/pharmacy #[id number] - [place], [place] - [address] [place] [address] -  [phone] - F [phone]  [address] [place] [address]      Phone: [phone]   acetaminophen 500 MG tablet  amoxicillin-clavulanate 875-125 MG per tablet  ibuprofen 600 MG tablet  ondansetron 4 MG disintegrating tablet  promethazine-dextromethorphan 6.25-15 MG/5ML syrup           DISCONTINUED MEDICATIONS:  Discharge Medication List as of 3/20/2025  7:49 PM          I am the Primary Clinician of Record: Kristian Corona PA-C (electronically signed)    (Please note that parts of this dictation were completed with voice recognition software. Quite often unanticipated grammatical, syntax, homophones, and other interpretive errors are inadvertently transcribed by the computer software. Please disregards these errors. Please excuse any errors that have escaped final proofreading.)     Kristian Corona PA-C  03/20/25 [id number]

## 2025-03-20 NOTE — DISCHARGE INSTRUCTIONS
November 2020. Findings: Cardiomediastinal silhouette is within normal limits. Lungs are clear bilaterally. Pleural spaces are normal. Osseous structures are intact.     No acute cardiopulmonary disease. Electronically signed by Vel Moore MD    ------------------------------------------------------------------------------------------------------------  The evaluation and treatment you received in the Emergency Department were for an urgent problem. It is important that you follow-up with a doctor, nurse practitioner, or physician assistant to:  (1) confirm your diagnosis,  (2) re-evaluation of changes in your illness and treatment, and (3) for ongoing care. Please take your discharge instructions with you when you go to your follow-up appointment.     If you have any problem arranging a follow-up appointment, contact us!  If your symptoms become worse or you do not improve as expected, please return to us. We are available 24 hours a day.     If a prescription has been provided, please fill it as soon as possible to prevent a delay in treatment. If you have any questions or reservations about taking the medication due to side effects or interactions with other medications, please call your primary care provider or contact us directly.  Again, THANK YOU for choosing us to care for YOU!

## 2025-03-20 NOTE — ED TRIAGE NOTES
Since Sunday SOB started, coughing up mucus for 3 days. Un able to keep food and water down. Chills and sweat

## 2025-03-22 LAB
EKG ATRIAL RATE: 100 BPM
EKG DIAGNOSIS: NORMAL
EKG P AXIS: 52 DEGREES
EKG P-R INTERVAL: 136 MS
EKG Q-T INTERVAL: 338 MS
EKG QRS DURATION: 78 MS
EKG QTC CALCULATION (BAZETT): 436 MS
EKG R AXIS: 40 DEGREES
EKG T AXIS: 48 DEGREES
EKG VENTRICULAR RATE: 100 BPM

## (undated) DEVICE — ROCKER SWITCH PENCIL BLADE ELECTRODE, HOLSTER: Brand: EDGE

## (undated) DEVICE — 3M™ MEDIPORE™ H SOFT CLOTH SURGICAL TAPE, 2863, 3 IN X 10 YD, 12/CASE: Brand: 3M™ MEDIPORE™

## (undated) DEVICE — MARKER SKN REG TIP W/RULER -- STRL

## (undated) DEVICE — 3M™ SURGICAL CLIPPER PROFESSIONAL BLADE 9680: Brand: 3M™

## (undated) DEVICE — SPONGE GZ W4XL4IN COT 12 PLY TYP VII WVN C FLD DSGN

## (undated) DEVICE — SMARTSLEEVE SURGICAL GOWN, 3XL LONG: Brand: CONVERTORS

## (undated) DEVICE — GOWN,SIRUS,NONRNF,SETINSLV,XL,20/CS: Brand: MEDLINE

## (undated) DEVICE — SOLUTION IRRIG 1000ML STRL H2O USP PLAS POUR BTL

## (undated) DEVICE — YANKAUER,BULB TIP,W/O VENT,RIGID,STERILE: Brand: MEDLINE

## (undated) DEVICE — INTENDED FOR TISSUE SEPARATION, AND OTHER PROCEDURES THAT REQUIRE A SHARP SURGICAL BLADE TO PUNCTURE OR CUT.: Brand: BARD-PARKER ® CARBON RIB-BACK BLADES

## (undated) DEVICE — TUBING, SUCTION, 9/32" X 10', STRAIGHT: Brand: MEDLINE

## (undated) DEVICE — REM POLYHESIVE ADULT PATIENT RETURN ELECTRODE: Brand: VALLEYLAB

## (undated) DEVICE — 1200CC GUARDIAN II: Brand: GUARDIAN

## (undated) DEVICE — SMARTGOWN SURGICAL GOWN, LARGE: Brand: CONVERTORS

## (undated) DEVICE — MAGNETIC INSTRUMENT PAD 16" X 20"; LARGE; DISPOSABLE: Brand: CARDINAL HEALTH

## (undated) DEVICE — GLOVE ORANGE PI 7 1/2   MSG9075

## (undated) DEVICE — PACK,SET-UP: Brand: MEDLINE

## (undated) DEVICE — COUNTER NDL 40 COUNT HLD 70 FOAM BLK ADH W/ MAG

## (undated) DEVICE — SYR 10ML LUER LOK 1/5ML GRAD --

## (undated) DEVICE — SYRINGE IRRIG 60ML SFT PLIABLE BLB EZ TO GRP 1 HND USE W/

## (undated) DEVICE — SPONGE LAP PREWASHED 4X18 IN X RAY DETECTABLE SURG COUNT

## (undated) DEVICE — SPONGE GZ 4X4 IN 16-PLY DETECTABLE W/ DMT MSTR TAG

## (undated) DEVICE — WET SKIN PREP TRAY: Brand: MEDLINE INDUSTRIES, INC.

## (undated) DEVICE — GAUZE,PACKING STRIP,IODOFORM,1"X5YD,STRL: Brand: CURAD

## (undated) DEVICE — SOLUTION IRRIG 1000ML 0.9% SOD CHL USP POUR PLAS BTL